# Patient Record
Sex: FEMALE | Race: WHITE | NOT HISPANIC OR LATINO | Employment: FULL TIME | URBAN - METROPOLITAN AREA
[De-identification: names, ages, dates, MRNs, and addresses within clinical notes are randomized per-mention and may not be internally consistent; named-entity substitution may affect disease eponyms.]

---

## 2019-05-02 ENCOUNTER — APPOINTMENT (OUTPATIENT)
Dept: PREADMISSION TESTING | Facility: HOSPITAL | Age: 69
End: 2019-05-02

## 2019-05-02 ENCOUNTER — HOSPITAL ENCOUNTER (OUTPATIENT)
Dept: GENERAL RADIOLOGY | Facility: HOSPITAL | Age: 69
Discharge: HOME | End: 2019-05-02
Admitting: ORTHOPAEDIC SURGERY

## 2019-05-02 VITALS — BODY MASS INDEX: 39.82 KG/M2 | WEIGHT: 202.82 LBS | HEIGHT: 60 IN

## 2019-05-02 LAB
ABO GROUP BLD: NORMAL
ALBUMIN SERPL-MCNC: 4 G/DL (ref 3.5–5.2)
ALBUMIN/GLOB SERPL: 0.8 G/DL
ALP SERPL-CCNC: 103 U/L (ref 39–117)
ALT SERPL W P-5'-P-CCNC: 18 U/L (ref 1–33)
ANION GAP SERPL CALCULATED.3IONS-SCNC: 12 MMOL/L
AST SERPL-CCNC: 21 U/L (ref 1–32)
BACTERIA UR QL AUTO: ABNORMAL /HPF
BASOPHILS # BLD AUTO: 0.07 10*3/MM3 (ref 0–0.2)
BASOPHILS NFR BLD AUTO: 0.9 % (ref 0–1.5)
BILIRUB SERPL-MCNC: 0.4 MG/DL (ref 0.2–1.2)
BILIRUB UR QL STRIP: NEGATIVE
BLD GP AB SCN SERPL QL: NEGATIVE
BUN BLD-MCNC: 18 MG/DL (ref 8–23)
BUN/CREAT SERPL: 21.7 (ref 7–25)
CALCIUM SPEC-SCNC: 9.1 MG/DL (ref 8.6–10.5)
CHLORIDE SERPL-SCNC: 101 MMOL/L (ref 98–107)
CLARITY UR: CLEAR
CO2 SERPL-SCNC: 28 MMOL/L (ref 22–29)
COLOR UR: YELLOW
CREAT BLD-MCNC: 0.83 MG/DL (ref 0.57–1)
CRP SERPL-MCNC: 1.15 MG/DL (ref 0–0.5)
DEPRECATED RDW RBC AUTO: 45.2 FL (ref 37–54)
EOSINOPHIL # BLD AUTO: 0.14 10*3/MM3 (ref 0–0.4)
EOSINOPHIL NFR BLD AUTO: 1.7 % (ref 0.3–6.2)
ERYTHROCYTE [DISTWIDTH] IN BLOOD BY AUTOMATED COUNT: 13.2 % (ref 12.3–15.4)
ERYTHROCYTE [SEDIMENTATION RATE] IN BLOOD: 24 MM/HR (ref 0–30)
GFR SERPL CREATININE-BSD FRML MDRD: 68 ML/MIN/1.73
GLOBULIN UR ELPH-MCNC: 4.8 GM/DL
GLUCOSE BLD-MCNC: 110 MG/DL (ref 65–99)
GLUCOSE UR STRIP-MCNC: NEGATIVE MG/DL
HBA1C MFR BLD: 5.9 % (ref 4.8–5.6)
HCT VFR BLD AUTO: 43.4 % (ref 34–46.6)
HGB BLD-MCNC: 13.9 G/DL (ref 12–15.9)
HGB UR QL STRIP.AUTO: NEGATIVE
HYALINE CASTS UR QL AUTO: ABNORMAL /LPF
IMM GRANULOCYTES # BLD AUTO: 0.02 10*3/MM3 (ref 0–0.05)
IMM GRANULOCYTES NFR BLD AUTO: 0.2 % (ref 0–0.5)
KETONES UR QL STRIP: NEGATIVE
LEUKOCYTE ESTERASE UR QL STRIP.AUTO: NEGATIVE
LYMPHOCYTES # BLD AUTO: 2.19 10*3/MM3 (ref 0.7–3.1)
LYMPHOCYTES NFR BLD AUTO: 26.7 % (ref 19.6–45.3)
MCH RBC QN AUTO: 30.1 PG (ref 26.6–33)
MCHC RBC AUTO-ENTMCNC: 32 G/DL (ref 31.5–35.7)
MCV RBC AUTO: 93.9 FL (ref 79–97)
MONOCYTES # BLD AUTO: 0.35 10*3/MM3 (ref 0.1–0.9)
MONOCYTES NFR BLD AUTO: 4.3 % (ref 5–12)
NEUTROPHILS # BLD AUTO: 5.44 10*3/MM3 (ref 1.7–7)
NEUTROPHILS NFR BLD AUTO: 66.4 % (ref 42.7–76)
NITRITE UR QL STRIP: NEGATIVE
PH UR STRIP.AUTO: 5.5 [PH] (ref 5–8)
PLATELET # BLD AUTO: 384 10*3/MM3 (ref 140–450)
PMV BLD AUTO: 10 FL (ref 6–12)
POTASSIUM BLD-SCNC: 3.8 MMOL/L (ref 3.5–5.2)
PROT SERPL-MCNC: 8.8 G/DL (ref 6–8.5)
PROT UR QL STRIP: NEGATIVE
RBC # BLD AUTO: 4.62 10*6/MM3 (ref 3.77–5.28)
RBC # UR: ABNORMAL /HPF
REF LAB TEST METHOD: ABNORMAL
RH BLD: POSITIVE
SODIUM BLD-SCNC: 141 MMOL/L (ref 136–145)
SP GR UR STRIP: 1.01 (ref 1–1.03)
SQUAMOUS #/AREA URNS HPF: ABNORMAL /HPF
UROBILINOGEN UR QL STRIP: NORMAL
WBC NRBC COR # BLD: 8.19 10*3/MM3 (ref 3.4–10.8)
WBC UR QL AUTO: ABNORMAL /HPF

## 2019-05-02 PROCEDURE — 86900 BLOOD TYPING SEROLOGIC ABO: CPT | Performed by: ORTHOPAEDIC SURGERY

## 2019-05-02 PROCEDURE — 81001 URINALYSIS AUTO W/SCOPE: CPT | Performed by: ORTHOPAEDIC SURGERY

## 2019-05-02 PROCEDURE — 36415 COLL VENOUS BLD VENIPUNCTURE: CPT

## 2019-05-02 PROCEDURE — 86901 BLOOD TYPING SEROLOGIC RH(D): CPT | Performed by: ORTHOPAEDIC SURGERY

## 2019-05-02 PROCEDURE — 83036 HEMOGLOBIN GLYCOSYLATED A1C: CPT | Performed by: ORTHOPAEDIC SURGERY

## 2019-05-02 PROCEDURE — 80053 COMPREHEN METABOLIC PANEL: CPT | Performed by: ORTHOPAEDIC SURGERY

## 2019-05-02 PROCEDURE — 86140 C-REACTIVE PROTEIN: CPT | Performed by: ORTHOPAEDIC SURGERY

## 2019-05-02 PROCEDURE — 86850 RBC ANTIBODY SCREEN: CPT | Performed by: ORTHOPAEDIC SURGERY

## 2019-05-02 PROCEDURE — 85025 COMPLETE CBC W/AUTO DIFF WBC: CPT | Performed by: ORTHOPAEDIC SURGERY

## 2019-05-02 PROCEDURE — 93010 ELECTROCARDIOGRAM REPORT: CPT | Performed by: INTERNAL MEDICINE

## 2019-05-02 PROCEDURE — 71046 X-RAY EXAM CHEST 2 VIEWS: CPT

## 2019-05-02 PROCEDURE — 85652 RBC SED RATE AUTOMATED: CPT | Performed by: ORTHOPAEDIC SURGERY

## 2019-05-02 PROCEDURE — 93005 ELECTROCARDIOGRAM TRACING: CPT

## 2019-05-02 RX ORDER — HYDROCHLOROTHIAZIDE 25 MG/1
25 TABLET ORAL DAILY
COMMUNITY

## 2019-05-02 RX ORDER — ACETAMINOPHEN 500 MG
500 TABLET ORAL EVERY 6 HOURS PRN
COMMUNITY

## 2019-05-02 RX ORDER — MELATONIN
1000 DAILY
COMMUNITY

## 2019-05-02 RX ORDER — LEVOTHYROXINE SODIUM 0.07 MG/1
75 TABLET ORAL DAILY
COMMUNITY

## 2019-05-02 RX ORDER — LISINOPRIL 40 MG/1
40 TABLET ORAL DAILY
COMMUNITY

## 2019-05-02 ASSESSMENT — HOOS JR
HOOS JR SCORE: 52.965
HOOS JR SCORE: 12

## 2019-05-02 NOTE — PAT
Patient attended joint replacement class today during PAT visit.    Patient to apply Chlorhexadine wipes  to surgical area (as instructed) the night before procedure and the AM of procedure. Wipes provided.    Per Anesthesia Request, patient instructed not to take their ACE/ARB medications on the AM of surgery.    Patient instructed to drink 20 ounces (or until full) of Gatorade or 20 ounces of G2 (if diabetic) and complete 1 hour before arrival time for procedure (NO RED Gatorade or G2)    Patient verbalized understanding.    Patient passed memory screen today.

## 2019-05-12 ENCOUNTER — ANESTHESIA EVENT (OUTPATIENT)
Dept: PERIOP | Facility: HOSPITAL | Age: 69
End: 2019-05-12

## 2019-05-12 RX ORDER — FAMOTIDINE 10 MG/ML
20 INJECTION, SOLUTION INTRAVENOUS ONCE
Status: CANCELLED | OUTPATIENT
Start: 2019-05-12 | End: 2019-05-12

## 2019-05-13 ENCOUNTER — APPOINTMENT (OUTPATIENT)
Dept: GENERAL RADIOLOGY | Facility: HOSPITAL | Age: 69
End: 2019-05-13

## 2019-05-13 ENCOUNTER — ANESTHESIA (OUTPATIENT)
Dept: PERIOP | Facility: HOSPITAL | Age: 69
End: 2019-05-13

## 2019-05-13 ENCOUNTER — HOSPITAL ENCOUNTER (INPATIENT)
Facility: HOSPITAL | Age: 69
LOS: 1 days | Discharge: HOME HEALTH | End: 2019-05-14
Attending: ORTHOPAEDIC SURGERY | Admitting: ORTHOPAEDIC SURGERY

## 2019-05-13 DIAGNOSIS — Z96.642 STATUS POST TOTAL REPLACEMENT OF LEFT HIP: ICD-10-CM

## 2019-05-13 DIAGNOSIS — Z74.09 IMPAIRED FUNCTIONAL MOBILITY, BALANCE, GAIT, AND ENDURANCE: Primary | ICD-10-CM

## 2019-05-13 DIAGNOSIS — M16.12 ARTHRITIS OF LEFT HIP: ICD-10-CM

## 2019-05-13 PROBLEM — E03.9 HYPOTHYROID: Status: ACTIVE | Noted: 2019-05-13

## 2019-05-13 PROBLEM — I10 HTN (HYPERTENSION): Status: ACTIVE | Noted: 2019-05-13

## 2019-05-13 LAB
ABO GROUP BLD: NORMAL
BLD GP AB SCN SERPL QL: NEGATIVE
GLUCOSE BLDC GLUCOMTR-MCNC: 166 MG/DL (ref 70–130)
POTASSIUM BLD-SCNC: 3.8 MMOL/L (ref 3.5–5.2)
RH BLD: POSITIVE
T&S EXPIRATION DATE: NORMAL

## 2019-05-13 PROCEDURE — 86900 BLOOD TYPING SEROLOGIC ABO: CPT | Performed by: ORTHOPAEDIC SURGERY

## 2019-05-13 PROCEDURE — 25010000002 FENTANYL CITRATE (PF) 100 MCG/2ML SOLUTION: Performed by: NURSE ANESTHETIST, CERTIFIED REGISTERED

## 2019-05-13 PROCEDURE — 25010000002 PHENYLEPHRINE PER 1 ML: Performed by: NURSE ANESTHETIST, CERTIFIED REGISTERED

## 2019-05-13 PROCEDURE — 25010000002 ONDANSETRON PER 1 MG: Performed by: ORTHOPAEDIC SURGERY

## 2019-05-13 PROCEDURE — C1776 JOINT DEVICE (IMPLANTABLE): HCPCS | Performed by: ORTHOPAEDIC SURGERY

## 2019-05-13 PROCEDURE — 86850 RBC ANTIBODY SCREEN: CPT | Performed by: ORTHOPAEDIC SURGERY

## 2019-05-13 PROCEDURE — 76000 FLUOROSCOPY <1 HR PHYS/QHP: CPT

## 2019-05-13 PROCEDURE — 25010000003 CEFAZOLIN IN DEXTROSE 2-4 GM/100ML-% SOLUTION: Performed by: ORTHOPAEDIC SURGERY

## 2019-05-13 PROCEDURE — 25010000002 DEXAMETHASONE PER 1 MG: Performed by: NURSE ANESTHETIST, CERTIFIED REGISTERED

## 2019-05-13 PROCEDURE — 82962 GLUCOSE BLOOD TEST: CPT

## 2019-05-13 PROCEDURE — 25010000002 ONDANSETRON PER 1 MG: Performed by: NURSE ANESTHETIST, CERTIFIED REGISTERED

## 2019-05-13 PROCEDURE — 84132 ASSAY OF SERUM POTASSIUM: CPT | Performed by: ANESTHESIOLOGY

## 2019-05-13 PROCEDURE — 25010000002 HYDROMORPHONE 1 MG/ML SOLUTION: Performed by: ORTHOPAEDIC SURGERY

## 2019-05-13 PROCEDURE — 86923 COMPATIBILITY TEST ELECTRIC: CPT

## 2019-05-13 PROCEDURE — 73502 X-RAY EXAM HIP UNI 2-3 VIEWS: CPT

## 2019-05-13 PROCEDURE — 25010000002 NEOSTIGMINE 10 MG/10ML SOLUTION: Performed by: NURSE ANESTHETIST, CERTIFIED REGISTERED

## 2019-05-13 PROCEDURE — 25010000002 PROPOFOL 1000 MG/ML EMULSION: Performed by: NURSE ANESTHETIST, CERTIFIED REGISTERED

## 2019-05-13 PROCEDURE — 86901 BLOOD TYPING SEROLOGIC RH(D): CPT | Performed by: ORTHOPAEDIC SURGERY

## 2019-05-13 PROCEDURE — 0SRB0JA REPLACEMENT OF LEFT HIP JOINT WITH SYNTHETIC SUBSTITUTE, UNCEMENTED, OPEN APPROACH: ICD-10-PCS | Performed by: ORTHOPAEDIC SURGERY

## 2019-05-13 DEVICE — OXINIUM FEMORAL HEAD 12/14 TAPER                                    32MM +0
Type: IMPLANTABLE DEVICE | Site: HIP | Status: FUNCTIONAL
Brand: OXINIUM

## 2019-05-13 DEVICE — POLARSTEM COLLAR STANDARD                                    NON-CEMENTED WITH TI/HA 2
Type: IMPLANTABLE DEVICE | Site: HIP | Status: FUNCTIONAL
Brand: POLARSTEM

## 2019-05-13 DEVICE — IMPLANTABLE DEVICE: Type: IMPLANTABLE DEVICE | Status: FUNCTIONAL

## 2019-05-13 DEVICE — R3 0 DEGREE XLPE ACETABULAR LINER                                    32MM ID X OD 50MM
Type: IMPLANTABLE DEVICE | Site: HIP | Status: FUNCTIONAL
Brand: R3

## 2019-05-13 DEVICE — R3 3 HOLE ACETABULAR SHELL 50MM
Type: IMPLANTABLE DEVICE | Site: HIP | Status: FUNCTIONAL
Brand: R3 ACETABULAR

## 2019-05-13 DEVICE — REFLECTION SPHERICAL HEAD SCREW 25MM
Type: IMPLANTABLE DEVICE | Site: HIP | Status: FUNCTIONAL
Brand: REFLECTION

## 2019-05-13 DEVICE — REFLECTION SPHERICAL HEAD SCREW 20MM
Type: IMPLANTABLE DEVICE | Site: HIP | Status: FUNCTIONAL
Brand: REFLECTION

## 2019-05-13 RX ORDER — FAMOTIDINE 20 MG/1
20 TABLET, FILM COATED ORAL ONCE
Status: COMPLETED | OUTPATIENT
Start: 2019-05-13 | End: 2019-05-13

## 2019-05-13 RX ORDER — LABETALOL HYDROCHLORIDE 5 MG/ML
10 INJECTION, SOLUTION INTRAVENOUS EVERY 4 HOURS PRN
Status: DISCONTINUED | OUTPATIENT
Start: 2019-05-13 | End: 2019-05-14 | Stop reason: HOSPADM

## 2019-05-13 RX ORDER — LIDOCAINE HYDROCHLORIDE 10 MG/ML
0.5 INJECTION, SOLUTION EPIDURAL; INFILTRATION; INTRACAUDAL; PERINEURAL ONCE AS NEEDED
Status: COMPLETED | OUTPATIENT
Start: 2019-05-13 | End: 2019-05-13

## 2019-05-13 RX ORDER — LABETALOL HYDROCHLORIDE 5 MG/ML
5 INJECTION, SOLUTION INTRAVENOUS
Status: DISCONTINUED | OUTPATIENT
Start: 2019-05-13 | End: 2019-05-13 | Stop reason: HOSPADM

## 2019-05-13 RX ORDER — SODIUM CHLORIDE 0.9 % (FLUSH) 0.9 %
3 SYRINGE (ML) INJECTION EVERY 12 HOURS SCHEDULED
Status: DISCONTINUED | OUTPATIENT
Start: 2019-05-13 | End: 2019-05-13 | Stop reason: HOSPADM

## 2019-05-13 RX ORDER — LIDOCAINE HYDROCHLORIDE 10 MG/ML
INJECTION, SOLUTION EPIDURAL; INFILTRATION; INTRACAUDAL; PERINEURAL AS NEEDED
Status: DISCONTINUED | OUTPATIENT
Start: 2019-05-13 | End: 2019-05-13 | Stop reason: SURG

## 2019-05-13 RX ORDER — NEOSTIGMINE METHYLSULFATE 1 MG/ML
INJECTION, SOLUTION INTRAVENOUS AS NEEDED
Status: DISCONTINUED | OUTPATIENT
Start: 2019-05-13 | End: 2019-05-13 | Stop reason: SURG

## 2019-05-13 RX ORDER — ONDANSETRON 2 MG/ML
4 INJECTION INTRAMUSCULAR; INTRAVENOUS ONCE AS NEEDED
Status: DISCONTINUED | OUTPATIENT
Start: 2019-05-13 | End: 2019-05-13 | Stop reason: HOSPADM

## 2019-05-13 RX ORDER — ONDANSETRON 2 MG/ML
INJECTION INTRAMUSCULAR; INTRAVENOUS AS NEEDED
Status: DISCONTINUED | OUTPATIENT
Start: 2019-05-13 | End: 2019-05-13 | Stop reason: SURG

## 2019-05-13 RX ORDER — LISINOPRIL 40 MG/1
40 TABLET ORAL DAILY
Status: DISCONTINUED | OUTPATIENT
Start: 2019-05-13 | End: 2019-05-14 | Stop reason: HOSPADM

## 2019-05-13 RX ORDER — EPHEDRINE SULFATE 50 MG/ML
5 INJECTION, SOLUTION INTRAVENOUS ONCE AS NEEDED
Status: DISCONTINUED | OUTPATIENT
Start: 2019-05-13 | End: 2019-05-13 | Stop reason: HOSPADM

## 2019-05-13 RX ORDER — SODIUM CHLORIDE 9 MG/ML
150 INJECTION, SOLUTION INTRAVENOUS CONTINUOUS
Status: DISCONTINUED | OUTPATIENT
Start: 2019-05-13 | End: 2019-05-14 | Stop reason: HOSPADM

## 2019-05-13 RX ORDER — DEXAMETHASONE SODIUM PHOSPHATE 4 MG/ML
INJECTION, SOLUTION INTRA-ARTICULAR; INTRALESIONAL; INTRAMUSCULAR; INTRAVENOUS; SOFT TISSUE AS NEEDED
Status: DISCONTINUED | OUTPATIENT
Start: 2019-05-13 | End: 2019-05-13 | Stop reason: SURG

## 2019-05-13 RX ORDER — MAGNESIUM HYDROXIDE 1200 MG/15ML
LIQUID ORAL AS NEEDED
Status: DISCONTINUED | OUTPATIENT
Start: 2019-05-13 | End: 2019-05-13 | Stop reason: HOSPADM

## 2019-05-13 RX ORDER — MEPERIDINE HYDROCHLORIDE 25 MG/ML
12.5 INJECTION INTRAMUSCULAR; INTRAVENOUS; SUBCUTANEOUS
Status: DISCONTINUED | OUTPATIENT
Start: 2019-05-13 | End: 2019-05-13 | Stop reason: HOSPADM

## 2019-05-13 RX ORDER — NALOXONE HCL 0.4 MG/ML
0.4 VIAL (ML) INJECTION AS NEEDED
Status: DISCONTINUED | OUTPATIENT
Start: 2019-05-13 | End: 2019-05-13 | Stop reason: HOSPADM

## 2019-05-13 RX ORDER — LEVOTHYROXINE SODIUM 0.07 MG/1
75 TABLET ORAL
Status: DISCONTINUED | OUTPATIENT
Start: 2019-05-14 | End: 2019-05-14 | Stop reason: HOSPADM

## 2019-05-13 RX ORDER — PREGABALIN 75 MG/1
75 CAPSULE ORAL ONCE
Status: COMPLETED | OUTPATIENT
Start: 2019-05-13 | End: 2019-05-13

## 2019-05-13 RX ORDER — GLYCOPYRROLATE 0.2 MG/ML
INJECTION INTRAMUSCULAR; INTRAVENOUS AS NEEDED
Status: DISCONTINUED | OUTPATIENT
Start: 2019-05-13 | End: 2019-05-13 | Stop reason: SURG

## 2019-05-13 RX ORDER — DOCUSATE SODIUM 100 MG/1
100 CAPSULE, LIQUID FILLED ORAL 2 TIMES DAILY
Status: DISCONTINUED | OUTPATIENT
Start: 2019-05-13 | End: 2019-05-14 | Stop reason: HOSPADM

## 2019-05-13 RX ORDER — CEFAZOLIN SODIUM 2 G/100ML
2 INJECTION, SOLUTION INTRAVENOUS EVERY 8 HOURS
Status: COMPLETED | OUTPATIENT
Start: 2019-05-13 | End: 2019-05-14

## 2019-05-13 RX ORDER — ONDANSETRON 4 MG/1
4 TABLET, FILM COATED ORAL EVERY 6 HOURS PRN
Status: DISCONTINUED | OUTPATIENT
Start: 2019-05-13 | End: 2019-05-14 | Stop reason: HOSPADM

## 2019-05-13 RX ORDER — NALOXONE HCL 0.4 MG/ML
0.1 VIAL (ML) INJECTION
Status: DISCONTINUED | OUTPATIENT
Start: 2019-05-13 | End: 2019-05-14 | Stop reason: HOSPADM

## 2019-05-13 RX ORDER — ATRACURIUM BESYLATE 10 MG/ML
INJECTION, SOLUTION INTRAVENOUS AS NEEDED
Status: DISCONTINUED | OUTPATIENT
Start: 2019-05-13 | End: 2019-05-13 | Stop reason: SURG

## 2019-05-13 RX ORDER — BISACODYL 10 MG
10 SUPPOSITORY, RECTAL RECTAL DAILY PRN
Status: DISCONTINUED | OUTPATIENT
Start: 2019-05-13 | End: 2019-05-14 | Stop reason: HOSPADM

## 2019-05-13 RX ORDER — ASPIRIN 325 MG
325 TABLET, DELAYED RELEASE (ENTERIC COATED) ORAL DAILY
Status: DISCONTINUED | OUTPATIENT
Start: 2019-05-14 | End: 2019-05-14 | Stop reason: HOSPADM

## 2019-05-13 RX ORDER — ONDANSETRON 2 MG/ML
4 INJECTION INTRAMUSCULAR; INTRAVENOUS EVERY 6 HOURS PRN
Status: DISCONTINUED | OUTPATIENT
Start: 2019-05-13 | End: 2019-05-14 | Stop reason: HOSPADM

## 2019-05-13 RX ORDER — HYDROCODONE BITARTRATE AND ACETAMINOPHEN 5; 325 MG/1; MG/1
1 TABLET ORAL EVERY 4 HOURS PRN
Status: DISCONTINUED | OUTPATIENT
Start: 2019-05-13 | End: 2019-05-13

## 2019-05-13 RX ORDER — CEFAZOLIN SODIUM 2 G/100ML
2 INJECTION, SOLUTION INTRAVENOUS ONCE
Status: COMPLETED | OUTPATIENT
Start: 2019-05-13 | End: 2019-05-13

## 2019-05-13 RX ORDER — FENTANYL CITRATE 50 UG/ML
INJECTION, SOLUTION INTRAMUSCULAR; INTRAVENOUS AS NEEDED
Status: DISCONTINUED | OUTPATIENT
Start: 2019-05-13 | End: 2019-05-13 | Stop reason: SURG

## 2019-05-13 RX ORDER — OXYCODONE HYDROCHLORIDE AND ACETAMINOPHEN 5; 325 MG/1; MG/1
1 TABLET ORAL EVERY 4 HOURS PRN
Status: DISCONTINUED | OUTPATIENT
Start: 2019-05-13 | End: 2019-05-14 | Stop reason: HOSPADM

## 2019-05-13 RX ORDER — SODIUM CHLORIDE, SODIUM LACTATE, POTASSIUM CHLORIDE, CALCIUM CHLORIDE 600; 310; 30; 20 MG/100ML; MG/100ML; MG/100ML; MG/100ML
100 INJECTION, SOLUTION INTRAVENOUS CONTINUOUS
Status: DISCONTINUED | OUTPATIENT
Start: 2019-05-13 | End: 2019-05-14 | Stop reason: SDUPTHER

## 2019-05-13 RX ORDER — FENTANYL CITRATE 50 UG/ML
50 INJECTION, SOLUTION INTRAMUSCULAR; INTRAVENOUS
Status: DISCONTINUED | OUTPATIENT
Start: 2019-05-13 | End: 2019-05-13 | Stop reason: HOSPADM

## 2019-05-13 RX ORDER — SODIUM CHLORIDE, SODIUM LACTATE, POTASSIUM CHLORIDE, CALCIUM CHLORIDE 600; 310; 30; 20 MG/100ML; MG/100ML; MG/100ML; MG/100ML
9 INJECTION, SOLUTION INTRAVENOUS CONTINUOUS
Status: DISCONTINUED | OUTPATIENT
Start: 2019-05-13 | End: 2019-05-14 | Stop reason: SDUPTHER

## 2019-05-13 RX ORDER — HYDROMORPHONE HYDROCHLORIDE 1 MG/ML
0.5 INJECTION, SOLUTION INTRAMUSCULAR; INTRAVENOUS; SUBCUTANEOUS
Status: DISCONTINUED | OUTPATIENT
Start: 2019-05-13 | End: 2019-05-13 | Stop reason: HOSPADM

## 2019-05-13 RX ORDER — BISACODYL 5 MG/1
10 TABLET, DELAYED RELEASE ORAL DAILY PRN
Status: DISCONTINUED | OUTPATIENT
Start: 2019-05-13 | End: 2019-05-14 | Stop reason: HOSPADM

## 2019-05-13 RX ORDER — SODIUM CHLORIDE 0.9 % (FLUSH) 0.9 %
3-10 SYRINGE (ML) INJECTION AS NEEDED
Status: DISCONTINUED | OUTPATIENT
Start: 2019-05-13 | End: 2019-05-13 | Stop reason: HOSPADM

## 2019-05-13 RX ADMIN — ONDANSETRON 4 MG: 2 INJECTION INTRAMUSCULAR; INTRAVENOUS at 17:04

## 2019-05-13 RX ADMIN — CEFAZOLIN SODIUM 2 G: 2 INJECTION, SOLUTION INTRAVENOUS at 15:26

## 2019-05-13 RX ADMIN — LIDOCAINE HYDROCHLORIDE 4 ML: 10 INJECTION, SOLUTION EPIDURAL; INFILTRATION; INTRACAUDAL; PERINEURAL at 15:48

## 2019-05-13 RX ADMIN — PHENYLEPHRINE HYDROCHLORIDE 160 MCG: 10 INJECTION INTRAVENOUS at 16:07

## 2019-05-13 RX ADMIN — NEOSTIGMINE METHYLSULFATE 3 MG: 1 INJECTION, SOLUTION INTRAVENOUS at 17:16

## 2019-05-13 RX ADMIN — HYDROMORPHONE HYDROCHLORIDE 0.5 MG: 1 INJECTION, SOLUTION INTRAMUSCULAR; INTRAVENOUS; SUBCUTANEOUS at 18:33

## 2019-05-13 RX ADMIN — SODIUM CHLORIDE, POTASSIUM CHLORIDE, SODIUM LACTATE AND CALCIUM CHLORIDE: 600; 310; 30; 20 INJECTION, SOLUTION INTRAVENOUS at 16:45

## 2019-05-13 RX ADMIN — TRANEXAMIC ACID 1000 MG: 100 INJECTION, SOLUTION INTRAVENOUS at 17:03

## 2019-05-13 RX ADMIN — FENTANYL CITRATE 50 MCG: 50 INJECTION INTRAMUSCULAR; INTRAVENOUS at 17:45

## 2019-05-13 RX ADMIN — FENTANYL CITRATE 100 MCG: 50 INJECTION, SOLUTION INTRAMUSCULAR; INTRAVENOUS at 15:48

## 2019-05-13 RX ADMIN — SODIUM CHLORIDE 150 ML/HR: 9 INJECTION, SOLUTION INTRAVENOUS at 18:37

## 2019-05-13 RX ADMIN — OXYCODONE HYDROCHLORIDE AND ACETAMINOPHEN 1 TABLET: 5; 325 TABLET ORAL at 21:52

## 2019-05-13 RX ADMIN — SODIUM CHLORIDE, POTASSIUM CHLORIDE, SODIUM LACTATE AND CALCIUM CHLORIDE 9 ML/HR: 600; 310; 30; 20 INJECTION, SOLUTION INTRAVENOUS at 12:12

## 2019-05-13 RX ADMIN — PROPOFOL 150 MCG/KG/MIN: 10 INJECTION, EMULSION INTRAVENOUS at 15:48

## 2019-05-13 RX ADMIN — TRANEXAMIC ACID 1000 MG: 100 INJECTION, SOLUTION INTRAVENOUS at 15:50

## 2019-05-13 RX ADMIN — MUPIROCIN 1 APPLICATION: 20 OINTMENT TOPICAL at 12:14

## 2019-05-13 RX ADMIN — LIDOCAINE HYDROCHLORIDE 0.5 ML: 10 INJECTION, SOLUTION EPIDURAL; INFILTRATION; INTRACAUDAL; PERINEURAL at 12:12

## 2019-05-13 RX ADMIN — LISINOPRIL 40 MG: 40 TABLET ORAL at 21:07

## 2019-05-13 RX ADMIN — FENTANYL CITRATE 50 MCG: 50 INJECTION INTRAMUSCULAR; INTRAVENOUS at 17:36

## 2019-05-13 RX ADMIN — ONDANSETRON 4 MG: 2 INJECTION INTRAMUSCULAR; INTRAVENOUS at 18:31

## 2019-05-13 RX ADMIN — FAMOTIDINE 20 MG: 20 TABLET ORAL at 12:12

## 2019-05-13 RX ADMIN — DEXAMETHASONE SODIUM PHOSPHATE 8 MG: 4 INJECTION, SOLUTION INTRAMUSCULAR; INTRAVENOUS at 15:48

## 2019-05-13 RX ADMIN — PREGABALIN 75 MG: 75 CAPSULE ORAL at 12:14

## 2019-05-13 RX ADMIN — DOCUSATE SODIUM 100 MG: 100 CAPSULE, LIQUID FILLED ORAL at 21:07

## 2019-05-13 RX ADMIN — GLYCOPYRROLATE 0.4 MG: 0.2 INJECTION, SOLUTION INTRAMUSCULAR; INTRAVENOUS at 17:16

## 2019-05-13 RX ADMIN — ATRACURIUM BESYLATE 40 MG: 10 INJECTION, SOLUTION INTRAVENOUS at 15:48

## 2019-05-13 NOTE — ANESTHESIA PROCEDURE NOTES
Airway  Urgency: elective    Airway not difficult    General Information and Staff    Patient location during procedure: OR  CRNA: Edgar Edward CRNA    Indications and Patient Condition  Indications for airway management: airway protection    Preoxygenated: yes  MILS not maintained throughout  Mask difficulty assessment: 1 - vent by mask    Final Airway Details  Final airway type: endotracheal airway      Successful airway: ETT  Cuffed: yes   Successful intubation technique: direct laryngoscopy  Facilitating devices/methods: Bougie  Endotracheal tube insertion site: oral  Blade: Shoemaker  Blade size: 2  ETT size (mm): 7.0  Cormack-Lehane Classification: grade IIa - partial view of glottis  Placement verified by: chest auscultation and capnometry   Cuff volume (mL): 5  Measured from: lips  ETT to lips (cm): 21  Number of attempts at approach: 1    Additional Comments  Negative epigastric sounds, Breath sound equal bilaterally with symmetric chest rise and fall

## 2019-05-13 NOTE — ANESTHESIA POSTPROCEDURE EVALUATION
Patient: Mary Headley    Procedure Summary     Date:  05/13/19 Room / Location:   [facility] /  [facility]    Anesthesia Start:  1526 Anesthesia Stop:      Procedure:  TOTAL ANTERIOR LEFT  HIP ARTHROPLASTY (Left Hip) Diagnosis:      Surgeon:  Andriy Torres MD Provider:  Arslan Orourke MD    Anesthesia Type:  MAC, spinal ASA Status:  3          Anesthesia Type: MAC, spinal  Last vitals  /91  180/87 (05/13/19 1204)   Temp 98  98.4 °F (36.9 °C) (05/13/19 1204)   Pulse 84  79 (05/13/19 1204)   Resp 18  18 (05/13/19 1204)     SpO2 92  95 % (05/13/19 1204)     Post Anesthesia Care and Evaluation    Patient location during evaluation: PACU  Patient participation: complete - patient participated  Level of consciousness: awake and alert  Pain score: 3  Pain management: adequate  Airway patency: patent  Anesthetic complications: No anesthetic complications  PONV Status: none  Cardiovascular status: hemodynamically stable and acceptable  Respiratory status: nonlabored ventilation, acceptable and nasal cannula  Hydration status: acceptable

## 2019-05-13 NOTE — ANESTHESIA PREPROCEDURE EVALUATION
Anesthesia Evaluation     history of anesthetic complications: PONV               Airway   Mallampati: II  TM distance: >3 FB  Neck ROM: full  No difficulty expected  Dental - normal exam     Pulmonary - normal exam   (+) a smoker (quit 20 years ago) Former,   Cardiovascular - normal exam    (+) hypertension,       Neuro/Psych  GI/Hepatic/Renal/Endo    (+) morbid obesity,  hypothyroidism,     Musculoskeletal     Abdominal  - normal exam    Bowel sounds: normal.   Substance History      OB/GYN          Other   (+) arthritis                     Anesthesia Plan    ASA 3     MAC and spinal     intravenous induction   Anesthetic plan, all risks, benefits, and alternatives have been provided, discussed and informed consent has been obtained with: patient.    Plan discussed with CRNA.

## 2019-05-13 NOTE — H&P
Patient Name: Mary Headley  MRN: [id number]  : 1950  DOS: 2019    Attending: Andriy Torres MD    Primary Care Provider: Gloria Jarquin PA      Chief complaint:  Left hip pain    Subjective   Patient is a 69 y.o. female presented for scheduled surgery by Dr. Torres. She anticipates a left total hip replacement today. Her hip has been painful for years. She denies use of assistive device for ambulation or recent falls.    When seen preop she is doing well. She denies pain or other complaints. She denies nausea, shortness of breath or chest pain. No hx of DVT or PE.      Allergies:  Allergies   Allergen Reactions   • Hydrocodone Nausea Only   • Levaquin [Levofloxacin] Nausea Only   • Latex Rash       Meds:  Medications Prior to Admission   Medication Sig Dispense Refill Last Dose   • acetaminophen (TYLENOL) 500 MG tablet Take 500 mg by mouth Every 6 (Six) Hours As Needed for Mild Pain .   2019 at    • hydrochlorothiazide (HYDRODIURIL) 25 MG tablet Take 25 mg by mouth Daily.   2019 at 2000   • levothyroxine (SYNTHROID, LEVOTHROID) 75 MCG tablet Take 75 mcg by mouth Daily.   2019 at 0600   • lisinopril (PRINIVIL,ZESTRIL) 40 MG tablet Take 40 mg by mouth Daily.   2019 at 2000   • cholecalciferol (VITAMIN D3) 1000 units tablet Take 1,000 Units by mouth Daily.   2019       History:   Past Medical History:   Diagnosis Date   • Arthritis    • Disease of thyroid gland    • Diverticulitis    • Glaucoma    • Hypertension    • PONV (postoperative nausea and vomiting)    • Wears glasses      Past Surgical History:   Procedure Laterality Date   • COLONOSCOPY     • EYE SURGERY      daniela cataracts   • LASIK Right    • THYROIDECTOMY, PARTIAL     • TOTAL HIP ARTHROPLASTY Right    • TUBAL ABDOMINAL LIGATION       History reviewed. No pertinent family history.  Social History     Tobacco Use   • Smoking status: Former Smoker     Years: 25.00     Last attempt to quit:      Years since  quittin.3   • Smokeless tobacco: Never Used   Substance Use Topics   • Alcohol use: No     Frequency: Never   • Drug use: No   She is  with 2 children. He is retired from .    Review of Systems  All systems were reviewed and negative except for:  Gastrointestinal: positive for  diarrhea    Vital Signs  Visit Vitals  /87 (BP Location: Right arm, Patient Position: Lying)   Pulse 79   Temp 98.4 °F (36.9 °C) (Temporal)   Resp 18   SpO2 95%       Physical Exam:    General Appearance:    Alert, cooperative, in no acute distress   Head:    Normocephalic, without obvious abnormality, atraumatic   Eyes:            Lids and lashes normal, conjunctivae and sclerae normal, no   icterus, no pallor, corneas clear   Ears:    Ears appear intact with no abnormalities noted   Neck:   No adenopathy, supple, trachea midline, no thyromegaly   Lungs:     Clear to auscultation,respirations regular, even and                   unlabored    Heart:    Regular rhythm and normal rate, normal S1 and S2, no            murmur, no gallop   Abdomen:     Normal bowel sounds, no masses, no organomegaly, soft        non-tender, non-distended, no guarding, no rebound                 tenderness   Genitalia:    Deferred   Extremities:   Moves all extremities well, no edema, no cyanosis, no              redness   Pulses:   Pulses palpable and equal bilaterally   Skin:   No bleeding, bruising or rash   Neurologic:   Cranial nerves 2 - 12 grossly intact, sensation intact      I reviewed the patient's new clinical results.     Results for EMERALD DEL CID (MRN [id number]) as of 2019 14:21   Ref. Range 2019 10:47   Glucose Latest Ref Range: 65 - 99 mg/dL 110 (H)   Sodium Latest Ref Range: 136 - 145 mmol/L 141   Potassium Latest Ref Range: 3.5 - 5.2 mmol/L 3.8   CO2 Latest Ref Range: 22.0 - 29.0 mmol/L 28.0   Chloride Latest Ref Range: 98 - 107 mmol/L 101   Anion Gap Latest Units: mmol/L 12.0   Creatinine Latest Ref  Range: 0.57 - 1.00 mg/dL 0.83   BUN Latest Ref Range: 8 - 23 mg/dL 18   BUN/Creatinine Ratio Latest Ref Range: 7.0 - 25.0  21.7   Calcium Latest Ref Range: 8.6 - 10.5 mg/dL 9.1   eGFR Non  Am Latest Ref Range: >60 mL/min/1.73 68   Alkaline Phosphatase Latest Ref Range: 39 - 117 U/L 103   Total Protein Latest Ref Range: 6.0 - 8.5 g/dL 8.8 (H)   ALT (SGPT) Latest Ref Range: 1 - 33 U/L 18   AST (SGOT) Latest Ref Range: 1 - 32 U/L 21   Total Bilirubin Latest Ref Range: 0.2 - 1.2 mg/dL 0.4   Albumin Latest Ref Range: 3.50 - 5.20 g/dL 4.00   Globulin Latest Units: gm/dL 4.8   A/G Ratio Latest Units: g/dL 0.8   Hemoglobin A1C Latest Ref Range: 4.80 - 5.60 % 5.90 (H)   C-Reactive Protein Latest Ref Range: 0.00 - 0.50 mg/dL 1.15 (H)   WBC Latest Ref Range: 3.40 - 10.80 10*3/mm3 8.19   RBC Latest Ref Range: 3.77 - 5.28 10*6/mm3 4.62   Hemoglobin Latest Ref Range: 12.0 - 15.9 g/dL 13.9   Hematocrit Latest Ref Range: 34.0 - 46.6 % 43.4   RDW Latest Ref Range: 12.3 - 15.4 % 13.2   MCV Latest Ref Range: 79.0 - 97.0 fL 93.9   MCH Latest Ref Range: 26.6 - 33.0 pg 30.1   MCHC Latest Ref Range: 31.5 - 35.7 g/dL 32.0   MPV Latest Ref Range: 6.0 - 12.0 fL 10.0   Platelets Latest Ref Range: 140 - 450 10*3/mm3 384     Assessment and Plan:     Status post total replacement of left hip    Arthritis of left hip    HTN (hypertension)    Hypothyroid      Plan  1. PT/OT- early ambulation post op  2. Pain control-prns   3. IS-encourage  4. DVT proph- mechs/ASA  5. Bowel regimen  6. Resume home medications as appropriate  7. Monitor post-op labs  8. DC planning     HTN  - Continue home lisinopril  - Monitor BP   - Holding parameters for BP meds  - Labetalol PRN for SBP>170    Hypothyroid  - Continue home synthroid      Carolann Delgado, SARAH  05/13/19  2:12 PM

## 2019-05-13 NOTE — H&P
Pre-Op H&P  Mary Headley  [id number]  1950    Chief complaint: Left hip pain    HPI:    Patient is a 69 y.o.female who presents with left hip pain and found to have left hip primary osteoarthritis.  Here today for left anterior total hip arthroplasty.     Review of Systems:  General ROS: negative for chills, fever or skin lesions;  No changes since last office visit  Cardiovascular ROS: no chest pain or dyspnea on exertion  Respiratory ROS: no cough, shortness of breath, or wheezing    Allergies:   Allergies   Allergen Reactions   • Hydrocodone Nausea Only   • Levaquin [Levofloxacin] Nausea Only   • Latex Rash       Home Meds:    No current facility-administered medications on file prior to encounter.      No current outpatient medications on file prior to encounter.       PMH:   Past Medical History:   Diagnosis Date   • Arthritis    • Disease of thyroid gland    • Diverticulitis    • Glaucoma    • Hypertension    • PONV (postoperative nausea and vomiting)    • Wears glasses      PSH:    Past Surgical History:   Procedure Laterality Date   • COLONOSCOPY     • EYE SURGERY      daniela cataracts   • LASIK Right    • THYROIDECTOMY, PARTIAL     • TOTAL HIP ARTHROPLASTY Right    • TUBAL ABDOMINAL LIGATION       Social History:   Tobacco:   Social History     Tobacco Use   Smoking Status Former Smoker   • Years: 25.00   • Last attempt to quit:    • Years since quittin.3   Smokeless Tobacco Never Used      Alcohol:     Social History     Substance and Sexual Activity   Alcohol Use No   • Frequency: Never       Vitals:           /87 (BP Location: Right arm, Patient Position: Lying)   Pulse 79   Temp 98.4 °F (36.9 °C) (Temporal)   Resp 18   SpO2 95%     Physical Exam:  General Appearance:    Alert, cooperative, no distress, appears stated age   Head:    Normocephalic, without obvious abnormality, atraumatic   Lungs:     Clear to auscultation bilaterally, respirations unlabored    Heart:   Regular  rate and rhythm, S1 and S2 normal, no murmur, rub    or gallop    Abdomen:    Soft, non-tender.  +bowel sounds   Breast Exam:    deferred   Genitalia:    deferred   Extremities:   Extremities normal, atraumatic, no cyanosis or edema   Skin:   Skin color, texture, turgor normal, no rashes or lesions   Neurologic:   Grossly intact   Results Review  LABS:  Lab Results   Component Value Date    WBC 8.19 05/02/2019    HGB 13.9 05/02/2019    HCT 43.4 05/02/2019    MCV 93.9 05/02/2019     05/02/2019    NEUTROABS 5.44 05/02/2019    GLUCOSE 110 (H) 05/02/2019    BUN 18 05/02/2019    CREATININE 0.83 05/02/2019    EGFRIFNONA 68 05/02/2019     05/02/2019    K 3.8 05/02/2019     05/02/2019    CO2 28.0 05/02/2019    CALCIUM 9.1 05/02/2019    ALBUMIN 4.00 05/02/2019    AST 21 05/02/2019    ALT 18 05/02/2019    BILITOT 0.4 05/02/2019       RADIOLOGY:  Imaging Results (last 72 hours)     ** No results found for the last 72 hours. **          I reviewed the patient's new clinical results.    Cancer Staging (if applicable)  Cancer Patient: __ yes _x_no __unknown; If yes, clinical stage T:__ N:__M:__, stage group or __N/A    Impression: Left hip osteoarthritis    Plan: Left anterior total hip arthroplasty    Sarah Dowling, SARAH   5/13/2019   12:25 PM

## 2019-05-13 NOTE — OP NOTE
TOTAL HIP ARTHROPLASTY ANTERIOR  Progress Note    Mary Headley  5/13/2019    Pre-op Diagnosis:   Left hip osteoarthritis       Post-Op Diagnosis Codes:  Left hip osteoarthritis    Procedure/CPT® Codes:  46720    Procedure(s):  TOTAL ANTERIOR LEFT  HIP ARTHROPLASTY    Surgeon(s):  Andriy Torres MD    Anesthesia: Spinal    Staff:   Circulator: Amauri Alonso RN  Scrub Person: Jennifer Rain N; Floresita Springerphillipbonifacio  Vendor Representative: Yoon Fabian  Nursing Assistant: Meek Daniel  Assistant: Yang Cotter PA    Estimated Blood Loss: 550 mL    Urine Voided: * No values recorded between 5/13/2019  3:25 PM and 5/13/2019  5:17 PM *    Specimens:                None          Drains:      Findings: Expected high-grade femoral head arthropathy    Complications: None      Implants: [employer] R3 50 acetabular cup; screw x 3; N/N polyethylene liner             Polarstem press-fit femur size 2 KA with +0/ 32 neck/head adapter      Indications:  54-year-old woman with end-stage left hip osteoarthritis symptoms. X-rays show near complete mild narrowing left hip joint space with low-grade osteophytes.  She had similar pain relieved by right total hip replacement several years ago at this institution.  Risks benefits indications and rationale for total hip arthroplasty discussed at length with patient. Patient signs own consent after all questions are answered.      Procedure:  While in the OR spinal anesthetic achieved with satisfactory level. Turned to the supine position and prepped and draped in standard fashion for anterior approach to the left hip on the [place] table. Fluoroscopy used to assess limb lengths. These were restored with final component selection and position. Incision made over the tensor fascia kisha and routine dissection carried down to raise the fascia over the medial margin of the tensor muscle belly. Circumflex vessels were identified and cauterized. Bleeding reasonably controlled with  estimated total blood loss 550 mL. Femoral neck isolated. T-shaped capsulotomy created. Mild effusion relieved. Standard neck cut made and head removed without difficulty noting superior cartilage irregularities. Acetabulum was exposed circumferentially. Acetabulum reamed from size 45 to size 49 noting satisfactory exposure of the subchondral bone with the size 49. Size 50 final component was seated with satisfactory press-fit characteristics.  3 superior directed fixation screws were placed with maximal bony purchase due to soft bone characteristics. Polyethylene insert placed without difficulty. Horizontal tilt was thought to be approximately 40° from Hilgenreiner's line. Anteversion approximately 25°. Component was under the anterior inferior acetabular margin. Acetabulum was thoroughly irrigated before during and after component placement.      Attention turned to the proximal femur. Trochanteric hook placed and hip externally rotated eventually to 160° after complete posterior lateral releases. Standard piriformis landmarks were used. Broached from size 0/1 to size 2 with good position relative to standard landmarks. Calcar reamer was passed after trials showed satisfactory recovery of limb lengths. Final component seated completely relative to the reaming with excellent rotational stability. Limb lengths appeared to be restored best with +0 x 32 mm head and neck adapter. Final components assembled and reduced. Wound thoroughly irrigated and closed in layers without a drain. Bleeding appeared to be well controlled at closure. Joint space injected with ropivacaine through a spinal needle placed percutaneously during closure. Standard Prineo dressing applied. Final counts were correct. Patient stable to recovery having tolerated procedure well throughout.      Andriy Torres MD    Date: 5/13/2019  Time: 5:22 PM

## 2019-05-14 VITALS
DIASTOLIC BLOOD PRESSURE: 58 MMHG | RESPIRATION RATE: 16 BRPM | SYSTOLIC BLOOD PRESSURE: 122 MMHG | HEART RATE: 94 BPM | TEMPERATURE: 98.3 F | OXYGEN SATURATION: 93 %

## 2019-05-14 PROBLEM — D72.829 LEUKOCYTOSIS: Status: ACTIVE | Noted: 2019-05-14

## 2019-05-14 PROBLEM — E87.1 HYPONATREMIA: Status: ACTIVE | Noted: 2019-05-14

## 2019-05-14 PROBLEM — G89.18 ACUTE POSTOPERATIVE PAIN: Status: ACTIVE | Noted: 2019-05-14

## 2019-05-14 PROBLEM — D62 ACUTE BLOOD LOSS ANEMIA: Status: ACTIVE | Noted: 2019-05-14

## 2019-05-14 LAB
ANION GAP SERPL CALCULATED.3IONS-SCNC: 8 MMOL/L
BASOPHILS # BLD AUTO: 0.01 10*3/MM3 (ref 0–0.2)
BASOPHILS NFR BLD AUTO: 0.1 % (ref 0–1.5)
BUN BLD-MCNC: 16 MG/DL (ref 8–23)
BUN/CREAT SERPL: 19.8 (ref 7–25)
CALCIUM SPEC-SCNC: 8.2 MG/DL (ref 8.6–10.5)
CHLORIDE SERPL-SCNC: 102 MMOL/L (ref 98–107)
CO2 SERPL-SCNC: 25 MMOL/L (ref 22–29)
CREAT BLD-MCNC: 0.81 MG/DL (ref 0.57–1)
DEPRECATED RDW RBC AUTO: 45.7 FL (ref 37–54)
EOSINOPHIL # BLD AUTO: 0 10*3/MM3 (ref 0–0.4)
EOSINOPHIL NFR BLD AUTO: 0 % (ref 0.3–6.2)
ERYTHROCYTE [DISTWIDTH] IN BLOOD BY AUTOMATED COUNT: 13.2 % (ref 12.3–15.4)
GFR SERPL CREATININE-BSD FRML MDRD: 70 ML/MIN/1.73
GLUCOSE BLD-MCNC: 176 MG/DL (ref 65–99)
GLUCOSE BLDC GLUCOMTR-MCNC: 168 MG/DL (ref 70–130)
GLUCOSE BLDC GLUCOMTR-MCNC: 184 MG/DL (ref 70–130)
HCT VFR BLD AUTO: 36.2 % (ref 34–46.6)
HGB BLD-MCNC: 11.7 G/DL (ref 12–15.9)
IMM GRANULOCYTES # BLD AUTO: 0.05 10*3/MM3 (ref 0–0.05)
IMM GRANULOCYTES NFR BLD AUTO: 0.3 % (ref 0–0.5)
LYMPHOCYTES # BLD AUTO: 0.7 10*3/MM3 (ref 0.7–3.1)
LYMPHOCYTES NFR BLD AUTO: 4.9 % (ref 19.6–45.3)
MCH RBC QN AUTO: 30.4 PG (ref 26.6–33)
MCHC RBC AUTO-ENTMCNC: 32.3 G/DL (ref 31.5–35.7)
MCV RBC AUTO: 94 FL (ref 79–97)
MONOCYTES # BLD AUTO: 0.68 10*3/MM3 (ref 0.1–0.9)
MONOCYTES NFR BLD AUTO: 4.7 % (ref 5–12)
NEUTROPHILS # BLD AUTO: 13 10*3/MM3 (ref 1.7–7)
NEUTROPHILS NFR BLD AUTO: 90.3 % (ref 42.7–76)
PLATELET # BLD AUTO: 310 10*3/MM3 (ref 140–450)
PMV BLD AUTO: 10.1 FL (ref 6–12)
POTASSIUM BLD-SCNC: 4.2 MMOL/L (ref 3.5–5.2)
RBC # BLD AUTO: 3.85 10*6/MM3 (ref 3.77–5.28)
SODIUM BLD-SCNC: 135 MMOL/L (ref 136–145)
WBC NRBC COR # BLD: 14.39 10*3/MM3 (ref 3.4–10.8)

## 2019-05-14 PROCEDURE — 97162 PT EVAL MOD COMPLEX 30 MIN: CPT

## 2019-05-14 PROCEDURE — 97110 THERAPEUTIC EXERCISES: CPT

## 2019-05-14 PROCEDURE — 85025 COMPLETE CBC W/AUTO DIFF WBC: CPT | Performed by: ORTHOPAEDIC SURGERY

## 2019-05-14 PROCEDURE — 82962 GLUCOSE BLOOD TEST: CPT

## 2019-05-14 PROCEDURE — 25010000003 CEFAZOLIN IN DEXTROSE 2-4 GM/100ML-% SOLUTION: Performed by: ORTHOPAEDIC SURGERY

## 2019-05-14 PROCEDURE — 25010000002 ONDANSETRON PER 1 MG: Performed by: NURSE PRACTITIONER

## 2019-05-14 PROCEDURE — 80048 BASIC METABOLIC PNL TOTAL CA: CPT | Performed by: NURSE PRACTITIONER

## 2019-05-14 RX ORDER — OXYCODONE HYDROCHLORIDE AND ACETAMINOPHEN 5; 325 MG/1; MG/1
1 TABLET ORAL EVERY 4 HOURS PRN
Qty: 40 TABLET | Refills: 0 | Status: SHIPPED | OUTPATIENT
Start: 2019-05-14 | End: 2019-05-23

## 2019-05-14 RX ORDER — DOCUSATE SODIUM 100 MG/1
100 CAPSULE, LIQUID FILLED ORAL 2 TIMES DAILY
Qty: 60 CAPSULE | Refills: 0 | Status: SHIPPED | OUTPATIENT
Start: 2019-05-14

## 2019-05-14 RX ORDER — ONDANSETRON 4 MG/1
4 TABLET, FILM COATED ORAL EVERY 8 HOURS PRN
Qty: 30 TABLET | Refills: 0 | Status: SHIPPED | OUTPATIENT
Start: 2019-05-14

## 2019-05-14 RX ORDER — ASPIRIN 325 MG
325 TABLET, DELAYED RELEASE (ENTERIC COATED) ORAL DAILY
Qty: 30 TABLET | Refills: 0 | Status: SHIPPED | OUTPATIENT
Start: 2019-05-15

## 2019-05-14 RX ADMIN — CEFAZOLIN SODIUM 2 G: 2 INJECTION, SOLUTION INTRAVENOUS at 06:46

## 2019-05-14 RX ADMIN — OXYCODONE HYDROCHLORIDE AND ACETAMINOPHEN 1 TABLET: 5; 325 TABLET ORAL at 11:28

## 2019-05-14 RX ADMIN — ONDANSETRON 4 MG: 2 INJECTION INTRAMUSCULAR; INTRAVENOUS at 08:17

## 2019-05-14 RX ADMIN — OXYCODONE HYDROCHLORIDE AND ACETAMINOPHEN 1 TABLET: 5; 325 TABLET ORAL at 05:08

## 2019-05-14 RX ADMIN — LISINOPRIL 40 MG: 40 TABLET ORAL at 08:17

## 2019-05-14 RX ADMIN — CEFAZOLIN SODIUM 2 G: 2 INJECTION, SOLUTION INTRAVENOUS at 00:02

## 2019-05-14 RX ADMIN — DOCUSATE SODIUM 100 MG: 100 CAPSULE, LIQUID FILLED ORAL at 08:17

## 2019-05-14 RX ADMIN — ONDANSETRON 4 MG: 2 INJECTION INTRAMUSCULAR; INTRAVENOUS at 00:34

## 2019-05-14 RX ADMIN — LEVOTHYROXINE SODIUM 75 MCG: 75 TABLET ORAL at 05:08

## 2019-05-14 RX ADMIN — ASPIRIN 325 MG: 325 TABLET, COATED ORAL at 08:17

## 2019-05-14 NOTE — THERAPY DISCHARGE NOTE
Acute Care - Physical Therapy Initial Eval/Discharge  [facility]     Patient Name: Mary Headley  : 1950  MRN: [id number]  Today's Date: 2019   Onset of Illness/Injury or Date of Surgery: 19  Date of Referral to PT: 19  Referring Physician: MD Brian      Admit Date: 2019    Visit Dx:    ICD-10-CM ICD-9-CM   1. Impaired functional mobility, balance, gait, and endurance Z74.09 V49.89     Patient Active Problem List   Diagnosis   • Arthritis of left hip   • Status post total replacement of left hip   • HTN (hypertension)   • Hypothyroid     Past Medical History:   Diagnosis Date   • Arthritis    • Disease of thyroid gland    • Diverticulitis    • Glaucoma    • Hypertension    • PONV (postoperative nausea and vomiting)    • Wears glasses      Past Surgical History:   Procedure Laterality Date   • COLONOSCOPY     • EYE SURGERY      daniela cataracts   • LASIK Right    • THYROIDECTOMY, PARTIAL     • TOTAL HIP ARTHROPLASTY Right    • TUBAL ABDOMINAL LIGATION            PT ASSESSMENT (last 12 hours)      Physical Therapy Evaluation     Row Name 19 0821          PT Evaluation Time/Intention    Subjective Information  complains of;pain  -LM     Document Type  discharge evaluation/summary;therapy note (daily note)  -LM     Mode of Treatment  physical therapy;individual therapy  -LM     Patient Effort  excellent  -LM     Symptoms Noted During/After Treatment  none  -LM     Row Name 19 0821          General Information    Patient Profile Reviewed?  yes  -LM     Onset of Illness/Injury or Date of Surgery  19  -LM     Referring Physician  MD Brian  -LM     Patient Observations  alert;cooperative;agree to therapy  -LM     Patient/Family Observations  Daughter present.  -LM     General Observations of Patient  Pt received semi-de la rosa in bed, IV intact.  -LM     Prior Level of Function  min assist:;all household mobility;community mobility;gait;transfer;bed mobility;ADL's  -LM      Equipment Currently Used at Home  walker, rolling;cane, straight will be buying shower chair today  -LM     Pertinent History of Current Functional Problem  Pt is 69 year old female, presents for surgical management of L hip pain and dysfunction that failed to improve with conservative treatment.  -LM     Existing Precautions/Restrictions  fall  -LM     Risks Reviewed  patient and family:;LOB;nausea/vomiting;dizziness;increased discomfort;change in vital signs  -LM     Benefits Reviewed  patient and family:;improve function;increase independence;increase strength;increase balance;decrease pain  -LM     Barriers to Rehab  none identified  -LM     Row Name 05/14/19 0821          Relationship/Environment    Lives With  spouse  -LM     Concerns About Impact on Relationships  Pt reports will be staying with daughter for a few days after d/c.  -LM     Row Name 05/14/19 0821          Resource/Environmental Concerns    Current Living Arrangements  home/apartment/condo  -LM     Row Name 05/14/19 0821          Cognitive Assessment/Interventions    Additional Documentation  Cognitive Assessment/Intervention (Group)  -LM     Row Name 05/14/19 0821          Cognitive Assessment/Intervention- PT/OT    Affect/Mental Status (Cognitive)  WNL  -LM     Orientation Status (Cognition)  oriented x 4  -LM     Follows Commands (Cognition)  WNL  -LM     Row Name 05/14/19 0821          Safety Issues, Functional Mobility    Impairments Affecting Function (Mobility)  pain  -LM     Row Name 05/14/19 0821          Mobility Assessment/Treatment    Extremity Weight-bearing Status  left lower extremity  -LM     Left Lower Extremity (Weight-bearing Status)  weight-bearing as tolerated (WBAT)  -LM     Row Name 05/14/19 0821          Bed Mobility Assessment/Treatment    Bed Mobility Assessment/Treatment  supine-sit  -LM     Supine-Sit [place] (Bed Mobility)  minimum assist (75% patient effort);verbal cues  -LM     Bed Mobility, Safety Issues   decreased use of legs for bridging/pushing  -LM     Assistive Device (Bed Mobility)  bed rails  -LM     Comment (Bed Mobility)  Attempted supine-sit from HOB flat to mimic home environment. Pt required minimal assistance to move left LE toward EOB. Cues for sequencing to roll onto right side then push into sitting. Pt denied dizziness once sitting EOB.  -LM     Row Name 05/14/19 0821          Transfer Assessment/Treatment    Transfer Assessment/Treatment  sit-stand transfer;stand-sit transfer;toilet transfer  -LM     Comment (Transfers)  Verbal cues to push from bed with UEs when standing, reach for chair armrests when lowering to sit. Assisted patient into bathroom, cues for correct hand placement on grab bar. Pt independent with toileting hygiene.  -LM     Sit-Stand [place] (Transfers)  contact guard;verbal cues  -LM     Stand-Sit [place] (Transfers)  contact guard;verbal cues  -LM     [place]da Level (Toilet Transfer)  contact guard;verbal cues  -LM     Assistive Device (Toilet Transfer)  raised toilet seat;walker, front-wheeled;grab bars/safety frame  -LM     Row Name 05/14/19 0821          Sit-Stand Transfer    Assistive Device (Sit-Stand Transfers)  walker, front-wheeled  -LM     Row Name 05/14/19 0821          Stand-Sit Transfer    Assistive Device (Stand-Sit Transfers)  walker, front-wheeled  -LM     Row Name 05/14/19 0821          Toilet Transfer    Type (Toilet Transfer)  sit-stand;stand-sit  -LM     Row Name 05/14/19 0821          Gait/Stairs Assessment/Training    82321 - Gait Training Minutes   4  -LM     [place]da Level (Gait)  stand by assist;verbal cues  -LM     Assistive Device (Gait)  walker, front-wheeled  -LM     Distance in Feet (Gait)  500  -LM     Pattern (Gait)  step-through  -LM     Deviations/Abnormal Patterns (Gait)  left sided deviations;antalgic;bilateral deviations;vitor decreased;stride length decreased  -LM     Bilateral Gait Deviations  forward flexed posture;heel  strike decreased  -LM     Left Sided Gait Deviations  weight shift ability decreased  -LM     Comment (Gait/Stairs)  Pt ambulated with step-through pattern at slow pace. Pt demonstrated upright posture, good step length. Cues to decrease bilateral UE weight bearing and increase left LE weight bearing.   -LM     Row Name 05/14/19 0821          General ROM    GENERAL ROM COMMENTS  L hip impaired 25%; otherwise, BLE WFL  -LM     Row Name 05/14/19 0821          MMT (Manual Muscle Testing)    General MMT Comments  LLE grossly 3+/5, RLE 4/5  -LM     Row Name 05/14/19 0821          Motor Assessment/Intervention    Additional Documentation  Balance (Group);Therapeutic Exercise (Group);Therapeutic Exercise Interventions (Group)  -LM     Row Name 05/14/19 0821          Therapeutic Exercise    39359 - PT Therapeutic Exercise Minutes  8  -LM     Row Name 05/14/19 0821          Therapeutic Exercise    Lower Extremity (Therapeutic Exercise)  gluteal sets;heel slides, left;LAQ (long arc quad), left;marching while seated;quad sets, left;SAQ (short arc quad), left;SLR (straight leg raise), left  -LM     Lower Extremity Range of Motion (Therapeutic Exercise)  hip abduction/adduction, left;ankle dorsiflexion/plantar flexion, bilateral  -LM     Exercise Type (Therapeutic Exercise)  AROM (active range of motion)  -LM     Position (Therapeutic Exercise)  seated  -LM     Sets/Reps (Therapeutic Exercise)  x15 reps each  -LM     Comment (Therapeutic Exercise)  Cues for technique; Ronald SLR  -LM     Row Name 05/14/19 0821          Balance    Balance  static sitting balance;static standing balance  -     Row Name 05/14/19 0821          Static Sitting Balance    Level of [place] (Unsupported Sitting, Static Balance)  supervision  -LM     Sitting Position (Unsupported Sitting, Static Balance)  sitting in chair  -LM     Time Able to Maintain Position (Unsupported Sitting, Static Balance)  2 to 3 minutes  -     Row Name 05/14/19 0821           Static Standing Balance    Level of [place] (Supported Standing, Static Balance)  standby assist  -LM     Time Able to Maintain Position (Supported Standing, Static Balance)  1 to 2 minutes  -LM     Assistive Device Utilized (Supported Standing, Static Balance)  walker, rolling  -LM     Row Name 05/14/19 0821          Sensory Assessment/Intervention    Sensory General Assessment  no sensation deficits identified  -LM     Row Name 05/14/19 0821          Pain Assessment    Additional Documentation  Pain Scale: Numbers Pre/Post-Treatment (Group)  -LM     Row Name 05/14/19 0821          Pain Scale: Numbers Pre/Post-Treatment    Pain Scale: Numbers, Pretreatment  2/10  -LM     Pain Scale: Numbers, Post-Treatment  2/10  -LM     Pain Location - Side  Left  -LM     Pain Location - Orientation  lower  -LM     Pain Location  extremity  -LM     Pain Intervention(s)  Repositioned;Ambulation/increased activity  -LM     Row Name             Wound 05/13/19 1611 Left hip incision    Wound - Properties Group Date first assessed: 05/13/19  -LB Time first assessed: 1611  -LB Side: Left  -LB Location: hip  -LB Type: incision  -LB    Row Name 05/14/19 0821          Plan of Care Review    Plan of Care Reviewed With  patient;daughter  -LM     Row Name 05/14/19 0821          Physical Therapy Clinical Impression    Date of Referral to PT  05/13/19  -LM     PT Diagnosis (PT Clinical Impression)  s/p elective left total anterior hip arthroplasty  -LM     Prognosis (PT Clinical Impression)  good  -LM     Criteria for Skilled Interventions Met (PT Clinical Impression)  no;other (see comments) pt anticipating d/c today  -LM     Rehab Potential (PT Clinical Summary)  good, to achieve stated therapy goals  -LM     Care Plan Review (PT)  evaluation/treatment results reviewed;care plan/treatment goals reviewed;risks/benefits reviewed;current/potential barriers reviewed;patient/other agree to care plan  -LM     Care Plan Review, Other  Participant (PT Clinical Impression)  daughter  -LM     Referral Needed to Another Service (PT)  home health care  -LM     Row Name 05/14/19 0821          Vital Signs    O2 Delivery Pre Treatment  room air  -LM     O2 Delivery Intra Treatment  room air  -LM     O2 Delivery Post Treatment  room air  -LM     Pre Patient Position  Supine  -LM     Intra Patient Position  Standing  -LM     Post Patient Position  Sitting  -LM     Row Name 05/14/19 0821          Positioning and Restraints    Pre-Treatment Position  in bed  -LM     Post Treatment Position  chair  -LM     In Chair  notified nsg;reclined;sitting;call light within reach;encouraged to call for assist;exit alarm on;with family/caregiver;legs elevated  -LM     Row Name 05/14/19 0821          Physical Therapy Discharge Summary    Additional Documentation  Discharge Summary, PT Eval (Group)  -LM     Row Name 05/14/19 0821          Discharge Summary, PT Eval    Reason for Discharge (PT Discharge Summary)  patient discharged from this facility  -LM     Outcomes Achieved Upon Discharge (PT Discharge Summary)  discharge from facility occurred on same date as evaluation  -LM     Transfer to Another Level of Care or Facility (PT Discharge Summary)  patient to receive therapy via home health  -LM     Row Name 05/14/19 0821          Living Environment    Home Accessibility  wheelchair accessible ramp to enter  -LM       User Key  (r) = Recorded By, (t) = Taken By, (c) = Cosigned By    Initials Name Provider Type    Amauri Oglesby, RN Registered Nurse    Zamzam Kinsey, PT Physical Therapist          Physical Therapy Education     Title: PT OT SLP Therapies (Done)     Topic: Physical Therapy (Done)     Point: Mobility training (Done)     Learning Progress Summary           Patient Acceptance, E,D,H, NIKOLAI,DU by DENYS at 5/14/2019  8:21 AM    Comment:  Reviewed anterior hip precautions, benefits of activity, safety with mobility and transfers, correct gait mechanics, HEP,  car transfer.   Family Acceptance, E,D,H, VU,DU by  at 5/14/2019  8:21 AM    Comment:  Reviewed anterior hip precautions, benefits of activity, safety with mobility and transfers, correct gait mechanics, HEP, car transfer.                   Point: Home exercise program (Done)     Learning Progress Summary           Patient Acceptance, E,D,H, VU,DU by LM at 5/14/2019  8:21 AM    Comment:  Reviewed anterior hip precautions, benefits of activity, safety with mobility and transfers, correct gait mechanics, HEP, car transfer.   Family Acceptance, E,D,H, VU,DU by  at 5/14/2019  8:21 AM    Comment:  Reviewed anterior hip precautions, benefits of activity, safety with mobility and transfers, correct gait mechanics, HEP, car transfer.                   Point: Body mechanics (Done)     Learning Progress Summary           Patient Acceptance, E,D,H, VU,DU by  at 5/14/2019  8:21 AM    Comment:  Reviewed anterior hip precautions, benefits of activity, safety with mobility and transfers, correct gait mechanics, HEP, car transfer.   Family Acceptance, E,D,H, VU,DU by  at 5/14/2019  8:21 AM    Comment:  Reviewed anterior hip precautions, benefits of activity, safety with mobility and transfers, correct gait mechanics, HEP, car transfer.                   Point: Precautions (Done)     Learning Progress Summary           Patient Acceptance, E,D,H, VU,DU by  at 5/14/2019  8:21 AM    Comment:  Reviewed anterior hip precautions, benefits of activity, safety with mobility and transfers, correct gait mechanics, HEP, car transfer.   Family Acceptance, E,D,H, VU,DU by  at 5/14/2019  8:21 AM    Comment:  Reviewed anterior hip precautions, benefits of activity, safety with mobility and transfers, correct gait mechanics, HEP, car transfer.                               User Key     Initials Effective Dates Name Provider Type Discipline     04/03/18 -  Zamzam Connell, PT Physical Therapist PT                PT Recommendation and  Plan  Anticipated Discharge Disposition (PT): home with assist, home with home health  Therapy Frequency (PT Clinical Impression): 2 times/day  Outcome Summary/Treatment Plan (PT)  Anticipated Equipment Needs at Discharge (PT): other (see comments)(none)  Anticipated Discharge Disposition (PT): home with assist, home with home health  Referral Needed to Another Service (PT): home health care  Reason for Discharge (PT Discharge Summary): patient discharged from this facility  Plan of Care Reviewed With: patient, daughter  Outcome Summary: PT eval complete. Pt ambulated 500 feet with SBA and RW, demonstrated good balance and safety throughout. Reviewed anterior hip precautions and HEP. Pt required Ronald for bed mobility and CGA for transfers. Pt anticipating d/c home with assist (daughter) and HHPT today. PADD score: 10    Outcome Measures     Row Name 05/14/19 0821             How much help from another person do you currently need...    Turning from your back to your side while in flat bed without using bedrails?  3  -LM      Moving from lying on back to sitting on the side of a flat bed without bedrails?  3  -LM      Moving to and from a bed to a chair (including a wheelchair)?  3  -LM      Standing up from a chair using your arms (e.g., wheelchair, bedside chair)?  3  -LM      Climbing 3-5 steps with a railing?  3  -LM      To walk in hospital room?  4  -LM      AM-PAC 6 Clicks Score  19  -LM         Functional Assessment    Outcome Measure Options  AM-PAC 6 Clicks Basic Mobility (PT)  -LM        User Key  (r) = Recorded By, (t) = Taken By, (c) = Cosigned By    Initials Name Provider Type    Zamzam Kinsey PT Physical Therapist           Time Calculation:   PT Charges     Row Name 05/14/19 0821             Time Calculation    Start Time  0821  -LM      PT Received On  05/14/19  -LM      PT Goal Re-Cert Due Date  05/24/19  -LM         Time Calculation- PT    Total Timed Code Minutes- PT  12 minute(s)  -LM          Timed Charges    08383 - PT Therapeutic Exercise Minutes  8  -LM      85604 - Gait Training Minutes   4  -LM        User Key  (r) = Recorded By, (t) = Taken By, (c) = Cosigned By    Initials Name Provider Type    Zamzam Kinsey, PT Physical Therapist        Therapy Charges for Today     Code Description Service Date Service Provider Modifiers Qty    80861299749 HC PT THER PROC EA 15 MIN 5/14/2019 Zamzam Connell, PT GP 1    59419166054 HC PT EVAL MOD COMPLEXITY 3 5/14/2019 Zamzam Connell, PT GP 1          PT G-Codes  Outcome Measure Options: AM-PAC 6 Clicks Basic Mobility (PT)  AM-PAC 6 Clicks Score: 19    PT Discharge Summary  Anticipated Discharge Disposition (PT): home with assist, home with home health  Reason for Discharge: Discharge from facility  Outcomes Achieved: Discharge from facility occurred on same date as evluation  Discharge Destination: Home with assist, Home with home health    Zamzam Connell PT  5/14/2019

## 2019-05-14 NOTE — PROGRESS NOTES
Discharge Planning Assessment  [facility]     Patient Name: Mary Headley  MRN: [id number]  Today's Date: 5/14/2019    Admit Date: 5/13/2019    Discharge Needs Assessment     Row Name 05/14/19 1158       Living Environment    Lives With  spouse    Name(s) of Who Lives With Patient  :  Axel    Current Living Arrangements  home/apartment/condo    Family Caregiver if Needed  spouse;child(xander), adult    Quality of Family Relationships  helpful;involved;supportive    Able to Return to Prior Arrangements  yes    Living Arrangement Comments  Ms. Headley lives with her  in a one story home in [facility].  She stated that her  and daughter are available to assist her at home as needed.       Resource/Environmental Concerns    Transportation Concerns  car, none       Transition Planning    Patient/Family Anticipates Transition to  home with family    Patient/Family Anticipated Services at Transition  home health care    Transportation Anticipated  family or friend will provide       Discharge Needs Assessment    Equipment Currently Used at Home  walker, rolling ADL kit    Equipment Needed After Discharge  commode    Outpatient/Agency/Support Group Needs  homecare agency    Discharge Facility/Level of Care Needs  home with home health    Offered/Gave Vendor List  yes    Patient's Choice of Community Agency(s)  [facility]        Discharge Plan     Row Name 05/14/19 1201       Plan    Plan  Home with family assistance and [facility]    Patient/Family in Agreement with Plan  yes    Plan Comments  Met with Ms. Headley and her daughter, Rosa M, at the bedside for discharge planning.  Ms. Headley is ambulating with a rolling walker.  She requested a bedside commode for home use and did not have preference for provider.  Contacted [facility] and they will be delivering the commode to the hospital room today.  Discussed physical therapy needs and Ms. Headley requested [facility].  Called and faxed referral to [place] at home health agency.  No other needs identified.  Ms. Headley will be transported home by her daughter at discharge.  CM will continue to follow.    Final Discharge Disposition Code  06 - home with home health care        Destination      No service coordination in this encounter.      Durable Medical Equipment - Selection Complete      Service Provider Request Status Selected Services Address Phone Number Fax Number    [facility] Selected Durable Medical Equipment [address] [facility] [address] [phone]      Dialysis/Infusion      No service coordination in this encounter.      Home Medical Care - Selection Complete      Service Provider Request Status Selected Services Address Phone Number Fax Number    [facility] Selected Home Health Services [address] [address] [phone]      Therapy      No service coordination in this encounter.      Community Resources      No service coordination in this encounter.        Expected Discharge Date and Time     Expected Discharge Date Expected Discharge Time    May 14, 2019         Demographic Summary     Row Name 05/14/19 1145       General Information    Admission Type  inpatient    Arrived From  home    Reason for Consult  discharge planning    General Information Comments  PCP:  Gloria Jarquin       Contact Information    Permission Granted to Share Info With      Contact Information Comments  Daughter: Rosa M Gaspar,  [phone]        Functional Status     Row Name 05/14/19 1156       Functional Status    Usual Activity Tolerance  moderate    Current Activity Tolerance  -- See PT notes.       Functional Status, IADL    Medications  independent    Meal Preparation  independent    Housekeeping  independent    Laundry  independent    Shopping  independent       Mental Status    General Appearance WDL  WDL        Psychosocial    No  documentation.       Abuse/Neglect    No documentation.       Legal     Row Name 05/14/19 5822       Financial/Legal    Who Manages Finances if Patient Unable  No advance directives.    Finance Comments  Ms. Headley does NOT have prescription drug coverage.  Verified insurance with patient.        Substance Abuse    No documentation.       Patient Forms    No documentation.           Kendra Nguyen

## 2019-05-14 NOTE — DISCHARGE SUMMARY
Patient Name: Mary Headley  MRN: [id number]  : 1950  DOS: 2019    Attending: No att. providers found    Primary Care Provider: Gloria Jarquin PA    Date of Admission:.2019 11:30 AM    Date of Discharge:  2019    Discharge Diagnosis:     Status post total replacement of left hip    Arthritis of left hip    HTN (hypertension)    Hypothyroid    Leukocytosis, likely reactive    Acute blood loss anemia, mild, asymptomatic    Acute postoperative pain    Hyponatremia, mild      Hospital Course  Patient is a 69 y.o. female presented for left hip arthroplasty by Dr. Torres. She tolerated surgery well and was admitted for further medical management. Her hip has been painful for years. She denies use of assistive device for ambulation or recent falls.    The patient has done well postop. The patient has been able to ambulate 500 feet with PT.    The patient has had good pain control with PO pain medications.    Adjustments were made to pain medications to optimize postop pain management. Risks and benefits of opiate medications discussed with patient.    The patient was placed on DVT prophylaxis including aspirin. The patient was encouraged to use IS for atelectasis prophylaxis.     The patient was placed on a bowel regimen to prevent constipation while on pain medication.   The patient's H/H was monitored with a slight decrease that remained asymptomatic.    It is felt by all involved that the patient can discharge home at this time, and the patient has no further questions        Procedures Performed  2019     Pre-op Diagnosis:   Left hip osteoarthritis       Post-Op Diagnosis Codes:  Left hip osteoarthritis     Procedure/CPT® Codes:  26225     Procedure(s):  TOTAL ANTERIOR LEFT  HIP ARTHROPLASTY     Surgeon(s):  Andriy Torres MD    Pertinent Test Results:    I reviewed the patient's new clinical results.   Results from last 7 days   Lab Units 19  0535   WBC 10*3/mm3 14.39*    HEMOGLOBIN g/dL 11.7*   HEMATOCRIT % 36.2   PLATELETS 10*3/mm3 310     Results for EMERALD DEL CID (MRN [id number]) as of 2019 10:14   Ref. Range 2019 10:47   Hemoglobin Latest Ref Range: 12.0 - 15.9 g/dL 13.9   Hematocrit Latest Ref Range: 34.0 - 46.6 % 43.4     Results from last 7 days   Lab Units 19  0535 19  1212   SODIUM mmol/L 135*  --    POTASSIUM mmol/L 4.2 3.8   CHLORIDE mmol/L 102  --    CO2 mmol/L 25.0  --    BUN mg/dL 16  --    CREATININE mg/dL 0.81  --    CALCIUM mg/dL 8.2*  --    GLUCOSE mg/dL 176*  --      I reviewed the patient's new imaging including images and reports.      Physical therapy: PT eval complete. Pt ambulated 500 feet with SBA and RW, demonstrated good balance and safety throughout. Reviewed anterior hip precautions and HEP. Pt required Ronald for bed mobility and CGA for transfers. Pt anticipating d/c home with assist (daughter) and HHPT today. PADD score: 10    Discharge Assessment:    Vital Signs  Visit Vitals  /58 (BP Location: Left arm, Patient Position: Lying)   Pulse 94   Temp 98.3 °F (36.8 °C) (Oral)   Resp 16   SpO2 93%     Temp (24hrs), Av.6 °F (36.4 °C), Min:96.3 °F (35.7 °C), Max:98.3 °F (36.8 °C)      General Appearance:    Alert, cooperative, in no acute distress   Lungs:     Clear to auscultation,respirations regular, even and                   unlabored    Heart:    Regular rhythm and normal rate, normal S1 and S2   Abdomen:     Normal bowel sounds, no masses, no organomegaly, soft        non-tender, non-distended, no guarding, no rebound                 tenderness   Extremities:   Moves all extremities well, no edema, no cyanosis, no              Redness. Left hip Prineo CDI   Pulses:   Pulses palpable and equal bilaterally   Skin:   No bleeding, bruising or rash   Neurologic:   Cranial nerves 2 - 12 grossly intact, sensation intact. Flexion and dorsiflexion intact bilateral feet.       Discharge Disposition: Home    Discharge  Medications     Discharge Medications      New Medications      Instructions Start Date   aspirin  MG tablet   325 mg, Oral, Daily   Start Date:  5/15/2019     ondansetron 4 MG tablet  Commonly known as:  ZOFRAN   4 mg, Oral, Every 8 Hours PRN      oxyCODONE-acetaminophen 5-325 MG per tablet  Commonly known as:  PERCOCET   1 tablet, Oral, Every 4 Hours PRN      STOOL SOFTENER 100 MG capsule  Generic drug:  docusate sodium   100 mg, Oral, 2 Times Daily         Continue These Medications      Instructions Start Date   acetaminophen 500 MG tablet  Commonly known as:  TYLENOL   500 mg, Oral, Every 6 Hours PRN      cholecalciferol 1000 units tablet  Commonly known as:  VITAMIN D3   1,000 Units, Oral, Daily      hydrochlorothiazide 25 MG tablet  Commonly known as:  HYDRODIURIL   25 mg, Oral, Daily      levothyroxine 75 MCG tablet  Commonly known as:  SYNTHROID, LEVOTHROID   75 mcg, Oral, Daily      lisinopril 40 MG tablet  Commonly known as:  PRINIVIL,ZESTRIL   40 mg, Oral, Daily             Discharge Diet: Regular diet    Activity at Discharge: WBAT LLE    Follow-up Appointments  Dr. Torres per his orders    Discharge took over 30 min.  Discussed with the patient and her daughter,  and all questioned fully answered.     05/14/19  2:32 PM

## 2019-05-14 NOTE — DISCHARGE PLACEMENT REQUEST
"Mary Del Cid (69 y.o. Female)   Home Health referral.  From Kendra Nguyen RN BSN, Case Management,  [phone]    Date of Birth Social Security Number Address Home Phone MRN    1950  [address] [place] [address] [phone] [id number]    [employer] Marital Status          Unknown        Admission Date Admission Type Admitting Provider Attending Provider Department, Room/Bed    19 Elective Andriy Torres MD Ryan, Andrew W, MD [facility] 3G, S364/1    Discharge Date Discharge Disposition Discharge Destination         Home or Self Care              Attending Provider:  Andriy Torres MD    Allergies:  Hydrocodone, Levaquin [Levofloxacin], Latex    Isolation:  None   Infection:  None   Code Status:  CPR    Ht:  152.4 cm (60\")   Wt:  92 kg (202 lb 13.2 oz)    Admission Cmt:  None   Principal Problem:  Status post total replacement of left hip [Z96.642]                 Active Insurance as of 2019     Primary Coverage     Payor Plan Insurance Group Employer/Plan Group    MEDICARE MEDICARE A & B      Payor Plan Address Payor Plan Phone Number Payor Plan Fax Number Effective Dates    [address] [phone]  3/1/2013 - None Entered    [place] [address]       Subscriber Name Subscriber Birth Date Member ID       MARY DEL CID 1950 [id number]                 Emergency Contacts      (Rel.) Home Phone Work Phone Mobile Phone    collins morales (Daughter) -- -- [phone]        [address] [address]  Phone:  [phone]  Fax:   Date: May 14, 2019      Ambulatory Referral to Home Health     Patient:  Mary Del Cid MRN:  [id number]    [address] [place] [id number] :  1950  SSN:    Phone: [phone] Sex:  F      INSURANCE PAYOR PLAN GROUP # SUBSCRIBER ID   Primary:    MEDICARE [id number]   [id number]      Referring Provider " Information:  SIOMARA FLOWER Phone: [phone] Fax:       Referral Information:   # Visits:  1 Referral Type: Home Health [42]   Urgency:  Routine Referral Reason: Specialty Services Required   Start Date: May 14, 2019 End Date:  To be determined by Insurer   Diagnosis: Impaired functional mobility, balance, gait, and endurance (Z74.09 [ICD-10-CM] V49.89 [ICD-9-CM])  Arthritis of left hip (M16.12 [ICD-10-CM] 716.95 [ICD-9-CM])  Status post total replacement of left hip (Z96.642 [ICD-10-CM] V43.64 [ICD-9-CM])      Refer to Dept:   Refer to Provider:   Refer to Facility:       Face to Face Visit Date: 2019  Follow-up Provider for Plan of Care? I treated the patient in an acute care facility and will not continue treatment after discharge.  Follow-up Provider: LORNE JARQUIN [[id number]]  Reason/Clinical Findings: w/p left hip surgery  Describe mobility limitations that make leaving home difficult: Impaired functional mobility, balance, gait and endurance.  Nursing/Therapeutic Services Requested: Physical Therapy  Frequency: Other     This document serves as a request of services and does not constitute Insurance authorization or approval of services.  To determine eligibility, please contact the members Insurance carrier to verify and review coverage.     If you have medical questions regarding this request for services. Please contact [address] at [phone] between the hours of 8:00am - 5:00pm (Mon-Fri).       Verbal Order Mode: Per protocol: cosign required  Authorizing Provider: Siomara Flower MD  Authorizing Provider's NPI: [id number]     Order Entered By: Kendra Nguyen 2019 11:43 AM     Electronically signed by:  Dr. Flower               History & Physical      Carolann Delgado APRN at 2019  2:09 PM          Patient Name: Mary Headley  MRN: [id number]  : 1950  DOS: 2019    Attending: Siomara Flower MD    Primary Care Provider: Lorne Jarquin PA      Chief  complaint:  Left hip pain    Subjective   Patient is a 69 y.o. female presented for scheduled surgery by Dr. Torres. She anticipates a left total hip replacement today. Her hip has been painful for years. She denies use of assistive device for ambulation or recent falls.    When seen preop she is doing well. She denies pain or other complaints. She denies nausea, shortness of breath or chest pain. No hx of DVT or PE.      Allergies:  Allergies   Allergen Reactions   • Hydrocodone Nausea Only   • Levaquin [Levofloxacin] Nausea Only   • Latex Rash       Meds:  Medications Prior to Admission   Medication Sig Dispense Refill Last Dose   • acetaminophen (TYLENOL) 500 MG tablet Take 500 mg by mouth Every 6 (Six) Hours As Needed for Mild Pain .   2019 at    • hydrochlorothiazide (HYDRODIURIL) 25 MG tablet Take 25 mg by mouth Daily.   2019 at    • levothyroxine (SYNTHROID, LEVOTHROID) 75 MCG tablet Take 75 mcg by mouth Daily.   2019 at 0600   • lisinopril (PRINIVIL,ZESTRIL) 40 MG tablet Take 40 mg by mouth Daily.   2019 at 2000   • cholecalciferol (VITAMIN D3) 1000 units tablet Take 1,000 Units by mouth Daily.   2019       History:   Past Medical History:   Diagnosis Date   • Arthritis    • Disease of thyroid gland    • Diverticulitis    • Glaucoma    • Hypertension    • PONV (postoperative nausea and vomiting)    • Wears glasses      Past Surgical History:   Procedure Laterality Date   • COLONOSCOPY     • EYE SURGERY      daniela cataracts   • LASIK Right    • THYROIDECTOMY, PARTIAL     • TOTAL HIP ARTHROPLASTY Right    • TUBAL ABDOMINAL LIGATION       History reviewed. No pertinent family history.  Social History     Tobacco Use   • Smoking status: Former Smoker     Years: 25.00     Last attempt to quit:      Years since quittin.3   • Smokeless tobacco: Never Used   Substance Use Topics   • Alcohol use: No     Frequency: Never   • Drug use: No   She is  with 2 children. He is  retired from .    Review of Systems  All systems were reviewed and negative except for:  Gastrointestinal: positive for  diarrhea    Vital Signs  Visit Vitals  /87 (BP Location: Right arm, Patient Position: Lying)   Pulse 79   Temp 98.4 °F (36.9 °C) (Temporal)   Resp 18   SpO2 95%       Physical Exam:    General Appearance:    Alert, cooperative, in no acute distress   Head:    Normocephalic, without obvious abnormality, atraumatic   Eyes:            Lids and lashes normal, conjunctivae and sclerae normal, no   icterus, no pallor, corneas clear   Ears:    Ears appear intact with no abnormalities noted   Neck:   No adenopathy, supple, trachea midline, no thyromegaly   Lungs:     Clear to auscultation,respirations regular, even and                   unlabored    Heart:    Regular rhythm and normal rate, normal S1 and S2, no            murmur, no gallop   Abdomen:     Normal bowel sounds, no masses, no organomegaly, soft        non-tender, non-distended, no guarding, no rebound                 tenderness   Genitalia:    Deferred   Extremities:   Moves all extremities well, no edema, no cyanosis, no              redness   Pulses:   Pulses palpable and equal bilaterally   Skin:   No bleeding, bruising or rash   Neurologic:   Cranial nerves 2 - 12 grossly intact, sensation intact      I reviewed the patient's new clinical results.     Results for EMERALD DEL CID (MRN [id number]) as of 5/13/2019 14:21   Ref. Range 5/2/2019 10:47   Glucose Latest Ref Range: 65 - 99 mg/dL 110 (H)   Sodium Latest Ref Range: 136 - 145 mmol/L 141   Potassium Latest Ref Range: 3.5 - 5.2 mmol/L 3.8   CO2 Latest Ref Range: 22.0 - 29.0 mmol/L 28.0   Chloride Latest Ref Range: 98 - 107 mmol/L 101   Anion Gap Latest Units: mmol/L 12.0   Creatinine Latest Ref Range: 0.57 - 1.00 mg/dL 0.83   BUN Latest Ref Range: 8 - 23 mg/dL 18   BUN/Creatinine Ratio Latest Ref Range: 7.0 - 25.0  21.7   Calcium Latest Ref Range: 8.6 - 10.5 mg/dL  9.1   eGFR Non  Am Latest Ref Range: >60 mL/min/1.73 68   Alkaline Phosphatase Latest Ref Range: 39 - 117 U/L 103   Total Protein Latest Ref Range: 6.0 - 8.5 g/dL 8.8 (H)   ALT (SGPT) Latest Ref Range: 1 - 33 U/L 18   AST (SGOT) Latest Ref Range: 1 - 32 U/L 21   Total Bilirubin Latest Ref Range: 0.2 - 1.2 mg/dL 0.4   Albumin Latest Ref Range: 3.50 - 5.20 g/dL 4.00   Globulin Latest Units: gm/dL 4.8   A/G Ratio Latest Units: g/dL 0.8   Hemoglobin A1C Latest Ref Range: 4.80 - 5.60 % 5.90 (H)   C-Reactive Protein Latest Ref Range: 0.00 - 0.50 mg/dL 1.15 (H)   WBC Latest Ref Range: 3.40 - 10.80 10*3/mm3 8.19   RBC Latest Ref Range: 3.77 - 5.28 10*6/mm3 4.62   Hemoglobin Latest Ref Range: 12.0 - 15.9 g/dL 13.9   Hematocrit Latest Ref Range: 34.0 - 46.6 % 43.4   RDW Latest Ref Range: 12.3 - 15.4 % 13.2   MCV Latest Ref Range: 79.0 - 97.0 fL 93.9   MCH Latest Ref Range: 26.6 - 33.0 pg 30.1   MCHC Latest Ref Range: 31.5 - 35.7 g/dL 32.0   MPV Latest Ref Range: 6.0 - 12.0 fL 10.0   Platelets Latest Ref Range: 140 - 450 10*3/mm3 384     Assessment and Plan:     Status post total replacement of left hip    Arthritis of left hip    HTN (hypertension)    Hypothyroid      Plan  1. PT/OT- early ambulation post op  2. Pain control-prns   3. IS-encourage  4. DVT proph- mechs/ASA  5. Bowel regimen  6. Resume home medications as appropriate  7. Monitor post-op labs  8. DC planning     HTN  - Continue home lisinopril  - Monitor BP   - Holding parameters for BP meds  - Labetalol PRN for SBP>170    Hypothyroid  - Continue home synthroid      SARAH Schulz  05/13/19  2:12 PM    Electronically signed by Carolann Delgado APRN at 5/13/2019  2:22 PM     Sarah Dowling APRN at 5/13/2019 12:24 PM          Pre-Op H&P  Mary Headley  [id number]  1950    Chief complaint: Left hip pain    HPI:    Patient is a 69 y.o.female who presents with left hip pain and found to have left hip primary osteoarthritis.  Here today  for left anterior total hip arthroplasty.     Review of Systems:  General ROS: negative for chills, fever or skin lesions;  No changes since last office visit  Cardiovascular ROS: no chest pain or dyspnea on exertion  Respiratory ROS: no cough, shortness of breath, or wheezing    Allergies:   Allergies   Allergen Reactions   • Hydrocodone Nausea Only   • Levaquin [Levofloxacin] Nausea Only   • Latex Rash       Home Meds:    No current facility-administered medications on file prior to encounter.      No current outpatient medications on file prior to encounter.       PMH:   Past Medical History:   Diagnosis Date   • Arthritis    • Disease of thyroid gland    • Diverticulitis    • Glaucoma    • Hypertension    • PONV (postoperative nausea and vomiting)    • Wears glasses      PSH:    Past Surgical History:   Procedure Laterality Date   • COLONOSCOPY     • EYE SURGERY      daniela cataracts   • LASIK Right    • THYROIDECTOMY, PARTIAL     • TOTAL HIP ARTHROPLASTY Right    • TUBAL ABDOMINAL LIGATION       Social History:   Tobacco:   Social History     Tobacco Use   Smoking Status Former Smoker   • Years: 25.00   • Last attempt to quit:    • Years since quittin.3   Smokeless Tobacco Never Used      Alcohol:     Social History     Substance and Sexual Activity   Alcohol Use No   • Frequency: Never       Vitals:           /87 (BP Location: Right arm, Patient Position: Lying)   Pulse 79   Temp 98.4 °F (36.9 °C) (Temporal)   Resp 18   SpO2 95%     Physical Exam:  General Appearance:    Alert, cooperative, no distress, appears stated age   Head:    Normocephalic, without obvious abnormality, atraumatic   Lungs:     Clear to auscultation bilaterally, respirations unlabored    Heart:   Regular rate and rhythm, S1 and S2 normal, no murmur, rub    or gallop    Abdomen:    Soft, non-tender.  +bowel sounds   Breast Exam:    deferred   Genitalia:    deferred   Extremities:   Extremities normal, atraumatic, no  cyanosis or edema   Skin:   Skin color, texture, turgor normal, no rashes or lesions   Neurologic:   Grossly intact   Results Review  LABS:  Lab Results   Component Value Date    WBC 8.19 05/02/2019    HGB 13.9 05/02/2019    HCT 43.4 05/02/2019    MCV 93.9 05/02/2019     05/02/2019    NEUTROABS 5.44 05/02/2019    GLUCOSE 110 (H) 05/02/2019    BUN 18 05/02/2019    CREATININE 0.83 05/02/2019    EGFRIFNONA 68 05/02/2019     05/02/2019    K 3.8 05/02/2019     05/02/2019    CO2 28.0 05/02/2019    CALCIUM 9.1 05/02/2019    ALBUMIN 4.00 05/02/2019    AST 21 05/02/2019    ALT 18 05/02/2019    BILITOT 0.4 05/02/2019       RADIOLOGY:  Imaging Results (last 72 hours)     ** No results found for the last 72 hours. **          I reviewed the patient's new clinical results.    Cancer Staging (if applicable)  Cancer Patient: __ yes _x_no __unknown; If yes, clinical stage T:__ N:__M:__, stage group or __N/A    Impression: Left hip osteoarthritis    Plan: Left anterior total hip arthroplasty    Sarah Dowling, SARAH   5/13/2019   12:25 PM    Electronically signed by Andriy Torres MD at 5/13/2019  2:55 PM          Operative/Procedure Notes (most recent note)      Andriy Torres MD at 5/13/2019  4:11 PM        TOTAL HIP ARTHROPLASTY ANTERIOR  Progress Note    Mary Headley  5/13/2019    Pre-op Diagnosis:   Left hip osteoarthritis       Post-Op Diagnosis Codes:  Left hip osteoarthritis    Procedure/CPT® Codes:  08363    Procedure(s):  TOTAL ANTERIOR LEFT  HIP ARTHROPLASTY    Surgeon(s):  Andriy Torres MD    Anesthesia: Spinal    Staff:   Circulator: Amauri Alonso RN  Scrub Person: Rain Rodriguez Rummana  Vendor Representative: Yoon Fabian  Nursing Assistant: Meek Daniel  Assistant: Yang Cotter PA    Estimated Blood Loss: 550 mL    Urine Voided: * No values recorded between 5/13/2019  3:25 PM and 5/13/2019  5:17 PM *    Specimens:                None          Drains:      Findings:  Expected high-grade femoral head arthropathy    Complications: None      Implants: [employer] R3 50 acetabular cup; screw x 3; N/N polyethylene liner             Polarstem press-fit femur size 2 KA with +0/ 32 neck/head adapter      Indications:  54-year-old woman with end-stage left hip osteoarthritis symptoms. X-rays show near complete mild narrowing left hip joint space with low-grade osteophytes.  She had similar pain relieved by right total hip replacement several years ago at this institution.  Risks benefits indications and rationale for total hip arthroplasty discussed at length with patient. Patient signs own consent after all questions are answered.      Procedure:  While in the OR spinal anesthetic achieved with satisfactory level. Turned to the supine position and prepped and draped in standard fashion for anterior approach to the left hip on the [place] table. Fluoroscopy used to assess limb lengths. These were restored with final component selection and position. Incision made over the tensor fascia kisha and routine dissection carried down to raise the fascia over the medial margin of the tensor muscle belly. Circumflex vessels were identified and cauterized. Bleeding reasonably controlled with estimated total blood loss 550 mL. Femoral neck isolated. T-shaped capsulotomy created. Mild effusion relieved. Standard neck cut made and head removed without difficulty noting superior cartilage irregularities. Acetabulum was exposed circumferentially. Acetabulum reamed from size 45 to size 49 noting satisfactory exposure of the subchondral bone with the size 49. Size 50 final component was seated with satisfactory press-fit characteristics.  3 superior directed fixation screws were placed with maximal bony purchase due to soft bone characteristics. Polyethylene insert placed without difficulty. Horizontal tilt was thought to be approximately 40° from Hilgenreiner's line. Anteversion approximately 25°.  Component was under the anterior inferior acetabular margin. Acetabulum was thoroughly irrigated before during and after component placement.      Attention turned to the proximal femur. Trochanteric hook placed and hip externally rotated eventually to 160° after complete posterior lateral releases. Standard piriformis landmarks were used. Broached from size 0/1 to size 2 with good position relative to standard landmarks. Calcar reamer was passed after trials showed satisfactory recovery of limb lengths. Final component seated completely relative to the reaming with excellent rotational stability. Limb lengths appeared to be restored best with +0 x 32 mm head and neck adapter. Final components assembled and reduced. Wound thoroughly irrigated and closed in layers without a drain. Bleeding appeared to be well controlled at closure. Joint space injected with ropivacaine through a spinal needle placed percutaneously during closure. Standard Prineo dressing applied. Final counts were correct. Patient stable to recovery having tolerated procedure well throughout.      Andriy Torres MD    Date: 5/13/2019  Time: 5:22 PM        Electronically signed by Andriy Torres MD at 5/13/2019  5:26 PM          Physician Progress Notes (most recent note)      Andriy Torres MD at 5/14/2019  8:09 AM          Mary Headley  [id number]  1950    /60 (BP Location: Right arm, Patient Position: Lying)   Pulse 88   Temp 97.8 °F (36.6 °C) (Oral)   Resp 16   SpO2 93%     Lab Results (last 24 hours)     Procedure Component Value Units Date/Time    CBC & Differential [[id number]] Collected:  05/14/19 0535    Specimen:  Blood Updated:  05/14/19 0706    Narrative:       The following orders were created for panel order CBC & Differential.  Procedure                               Abnormality         Status                     ---------                               -----------         ------                     CBC Auto  Differential[[id number]]        Abnormal            Final result                 Please view results for these tests on the individual orders.    CBC Auto Differential [[id number]]  (Abnormal) Collected:  05/14/19 0535    Specimen:  Blood Updated:  05/14/19 0706     WBC 14.39 10*3/mm3      RBC 3.85 10*6/mm3      Hemoglobin 11.7 g/dL      Hematocrit 36.2 %      MCV 94.0 fL      MCH 30.4 pg      MCHC 32.3 g/dL      RDW 13.2 %      RDW-SD 45.7 fl      MPV 10.1 fL      Platelets 310 10*3/mm3      Neutrophil % 90.3 %      Lymphocyte % 4.9 %      Monocyte % 4.7 %      Eosinophil % 0.0 %      Basophil % 0.1 %      Immature Grans % 0.3 %      Neutrophils, Absolute 13.00 10*3/mm3      Lymphocytes, Absolute 0.70 10*3/mm3      Monocytes, Absolute 0.68 10*3/mm3      Eosinophils, Absolute 0.00 10*3/mm3      Basophils, Absolute 0.01 10*3/mm3      Immature Grans, Absolute 0.05 10*3/mm3     Basic Metabolic Panel [[id number]]  (Abnormal) Collected:  05/14/19 0535    Specimen:  Blood Updated:  05/14/19 0700     Glucose 176 mg/dL      BUN 16 mg/dL      Creatinine 0.81 mg/dL      Sodium 135 mmol/L      Potassium 4.2 mmol/L      Chloride 102 mmol/L      CO2 25.0 mmol/L      Calcium 8.2 mg/dL      eGFR Non African Amer 70 mL/min/1.73      BUN/Creatinine Ratio 19.8     Anion Gap 8.0 mmol/L     Narrative:       GFR Normal >60  Chronic Kidney Disease <60  Kidney Failure <15    POC Glucose Once [[id number]]  (Abnormal) Collected:  05/13/19 2015    Specimen:  Blood Updated:  05/13/19 2016     Glucose 166 mg/dL     Potassium [[id number]]  (Normal) Collected:  05/13/19 1212    Specimen:  Blood Updated:  05/13/19 1249     Potassium 3.8 mmol/L           Patient Care Team:  Gloria Jarquin PA as PCP - General    SUBJECTIVE  Pain well controlled.  Good start in physical therapy.    PHYSICAL EXAM  Incision benign  Minimal thigh swelling  Neurovascularly intact to knees and toes       Status post total replacement of left hip    Arthritis of left hip     HTN (hypertension)    Hypothyroid      PLAN / DISPOSITION:  Hemoglobin 11.7 today - no transfusion anticipated  Discharge home today if cleared by PT    Andriy Torres MD  19  8:09 AM    Electronically signed by Andriy Torres MD at 2019  8:09 AM          Physical Therapy Notes (most recent note)      Zamzam Connell, PT at 2019  9:16 AM  Version 1 of 1         Acute Care - Physical Therapy Initial Eval/Discharge  [facility]     Patient Name: Mary Headley  : 1950  MRN: [id number]  Today's Date: 2019   Onset of Illness/Injury or Date of Surgery: 19  Date of Referral to PT: 19  Referring Physician: MD Brian      Admit Date: 2019    Visit Dx:    ICD-10-CM ICD-9-CM   1. Impaired functional mobility, balance, gait, and endurance Z74.09 V49.89     Patient Active Problem List   Diagnosis   • Arthritis of left hip   • Status post total replacement of left hip   • HTN (hypertension)   • Hypothyroid     Past Medical History:   Diagnosis Date   • Arthritis    • Disease of thyroid gland    • Diverticulitis    • Glaucoma    • Hypertension    • PONV (postoperative nausea and vomiting)    • Wears glasses      Past Surgical History:   Procedure Laterality Date   • COLONOSCOPY     • EYE SURGERY      daniela cataracts   • LASIK Right    • THYROIDECTOMY, PARTIAL     • TOTAL HIP ARTHROPLASTY Right    • TUBAL ABDOMINAL LIGATION            PT ASSESSMENT (last 12 hours)      Physical Therapy Evaluation     Row Name 19 0821          PT Evaluation Time/Intention    Subjective Information  complains of;pain  -LM     Document Type  discharge evaluation/summary;therapy note (daily note)  -LM     Mode of Treatment  physical therapy;individual therapy  -LM     Patient Effort  excellent  -LM     Symptoms Noted During/After Treatment  none  -LM     Row Name 19 0821          General Information    Patient Profile Reviewed?  yes  -LM     Onset of Illness/Injury or Date of Surgery   05/13/19  -LM     Referring Physician  MD Brian  -LM     Patient Observations  alert;cooperative;agree to therapy  -LM     Patient/Family Observations  Daughter present.  -LM     General Observations of Patient  Pt received semi-de la rosa in bed, IV intact.  -LM     Prior Level of Function  min assist:;all household mobility;community mobility;gait;transfer;bed mobility;ADL's  -LM     Equipment Currently Used at Home  walker, rolling;cane, straight will be buying shower chair today  -LM     Pertinent History of Current Functional Problem  Pt is 69 year old female, presents for surgical management of L hip pain and dysfunction that failed to improve with conservative treatment.  -LM     Existing Precautions/Restrictions  fall  -LM     Risks Reviewed  patient and family:;LOB;nausea/vomiting;dizziness;increased discomfort;change in vital signs  -LM     Benefits Reviewed  patient and family:;improve function;increase independence;increase strength;increase balance;decrease pain  -LM     Barriers to Rehab  none identified  -LM     Row Name 05/14/19 0821          Relationship/Environment    Lives With  spouse  -LM     Concerns About Impact on Relationships  Pt reports will be staying with daughter for a few days after d/c.  -LM     Row Name 05/14/19 0821          Resource/Environmental Concerns    Current Living Arrangements  home/apartment/condo  -LM     Row Name 05/14/19 0821          Cognitive Assessment/Interventions    Additional Documentation  Cognitive Assessment/Intervention (Group)  -LM     Row Name 05/14/19 0821          Cognitive Assessment/Intervention- PT/OT    Affect/Mental Status (Cognitive)  WNL  -LM     Orientation Status (Cognition)  oriented x 4  -LM     Follows Commands (Cognition)  WNL  -LM     Row Name 05/14/19 0821          Safety Issues, Functional Mobility    Impairments Affecting Function (Mobility)  pain  -LM     Row Name 05/14/19 0821          Mobility Assessment/Treatment    Extremity  Weight-bearing Status  left lower extremity  -LM     Left Lower Extremity (Weight-bearing Status)  weight-bearing as tolerated (WBAT)  -LM     Row Name 05/14/19 0821          Bed Mobility Assessment/Treatment    Bed Mobility Assessment/Treatment  supine-sit  -LM     Supine-Sit [place] (Bed Mobility)  minimum assist (75% patient effort);verbal cues  -LM     Bed Mobility, Safety Issues  decreased use of legs for bridging/pushing  -LM     Assistive Device (Bed Mobility)  bed rails  -LM     Comment (Bed Mobility)  Attempted supine-sit from HOB flat to mimic home environment. Pt required minimal assistance to move left LE toward EOB. Cues for sequencing to roll onto right side then push into sitting. Pt denied dizziness once sitting EOB.  -LM     Row Name 05/14/19 0821          Transfer Assessment/Treatment    Transfer Assessment/Treatment  sit-stand transfer;stand-sit transfer;toilet transfer  -LM     Comment (Transfers)  Verbal cues to push from bed with UEs when standing, reach for chair armrests when lowering to sit. Assisted patient into bathroom, cues for correct hand placement on grab bar. Pt independent with toileting hygiene.  -LM     Sit-Stand [place] (Transfers)  contact guard;verbal cues  -LM     Stand-Sit [place] (Transfers)  contact guard;verbal cues  -LM     Renner Level (Toilet Transfer)  contact guard;verbal cues  -LM     Assistive Device (Toilet Transfer)  raised toilet seat;walker, front-wheeled;grab bars/safety frame  -LM     Row Name 05/14/19 0821          Sit-Stand Transfer    Assistive Device (Sit-Stand Transfers)  walker, front-wheeled  -LM     Row Name 05/14/19 0821          Stand-Sit Transfer    Assistive Device (Stand-Sit Transfers)  walker, front-wheeled  -LM     Row Name 05/14/19 0821          Toilet Transfer    Type (Toilet Transfer)  sit-stand;stand-sit  -LM     Row Name 05/14/19 0821          Gait/Stairs Assessment/Training    03525 - Gait Training Minutes   4  -LM      [place] Level (Gait)  stand by assist;verbal cues  -LM     Assistive Device (Gait)  walker, front-wheeled  -LM     Distance in Feet (Gait)  500  -LM     Pattern (Gait)  step-through  -LM     Deviations/Abnormal Patterns (Gait)  left sided deviations;antalgic;bilateral deviations;vitor decreased;stride length decreased  -LM     Bilateral Gait Deviations  forward flexed posture;heel strike decreased  -LM     Left Sided Gait Deviations  weight shift ability decreased  -LM     Comment (Gait/Stairs)  Pt ambulated with step-through pattern at slow pace. Pt demonstrated upright posture, good step length. Cues to decrease bilateral UE weight bearing and increase left LE weight bearing.   -LM     Row Name 05/14/19 0821          General ROM    GENERAL ROM COMMENTS  L hip impaired 25%; otherwise, BLE WFL  -LM     Row Name 05/14/19 0821          MMT (Manual Muscle Testing)    General MMT Comments  LLE grossly 3+/5, RLE 4/5  -LM     Row Name 05/14/19 0821          Motor Assessment/Intervention    Additional Documentation  Balance (Group);Therapeutic Exercise (Group);Therapeutic Exercise Interventions (Group)  -LM     Row Name 05/14/19 0821          Therapeutic Exercise    18280 - PT Therapeutic Exercise Minutes  8  -LM     Row Name 05/14/19 0821          Therapeutic Exercise    Lower Extremity (Therapeutic Exercise)  gluteal sets;heel slides, left;LAQ (long arc quad), left;marching while seated;quad sets, left;SAQ (short arc quad), left;SLR (straight leg raise), left  -LM     Lower Extremity Range of Motion (Therapeutic Exercise)  hip abduction/adduction, left;ankle dorsiflexion/plantar flexion, bilateral  -LM     Exercise Type (Therapeutic Exercise)  AROM (active range of motion)  -LM     Position (Therapeutic Exercise)  seated  -LM     Sets/Reps (Therapeutic Exercise)  x15 reps each  -LM     Comment (Therapeutic Exercise)  Cues for technique; Ronald SLR  -LM     Row Name 05/14/19 0821          Balance    Balance  static  sitting balance;static standing balance  -LM     Row Name 05/14/19 0821          Static Sitting Balance    Level of [place] (Unsupported Sitting, Static Balance)  supervision  -LM     Sitting Position (Unsupported Sitting, Static Balance)  sitting in chair  -LM     Time Able to Maintain Position (Unsupported Sitting, Static Balance)  2 to 3 minutes  -LM     Row Name 05/14/19 0821          Static Standing Balance    Level of [place] (Supported Standing, Static Balance)  standby assist  -LM     Time Able to Maintain Position (Supported Standing, Static Balance)  1 to 2 minutes  -LM     Assistive Device Utilized (Supported Standing, Static Balance)  walker, rolling  -LM     Row Name 05/14/19 0821          Sensory Assessment/Intervention    Sensory General Assessment  no sensation deficits identified  -LM     Row Name 05/14/19 0821          Pain Assessment    Additional Documentation  Pain Scale: Numbers Pre/Post-Treatment (Group)  -LM     Row Name 05/14/19 0821          Pain Scale: Numbers Pre/Post-Treatment    Pain Scale: Numbers, Pretreatment  2/10  -LM     Pain Scale: Numbers, Post-Treatment  2/10  -LM     Pain Location - Side  Left  -LM     Pain Location - Orientation  lower  -LM     Pain Location  extremity  -LM     Pain Intervention(s)  Repositioned;Ambulation/increased activity  -LM     Row Name             Wound 05/13/19 1611 Left hip incision    Wound - Properties Group Date first assessed: 05/13/19  -LB Time first assessed: 1611  -LB Side: Left  -LB Location: hip  -LB Type: incision  -LB    Row Name 05/14/19 0821          Plan of Care Review    Plan of Care Reviewed With  patient;daughter  -LM     Row Name 05/14/19 0821          Physical Therapy Clinical Impression    Date of Referral to PT  05/13/19  -LM     PT Diagnosis (PT Clinical Impression)  s/p elective left total anterior hip arthroplasty  -LM     Prognosis (PT Clinical Impression)  good  -LM     Criteria for Skilled Interventions Met (PT  Clinical Impression)  no;other (see comments) pt anticipating d/c today  -LM     Rehab Potential (PT Clinical Summary)  good, to achieve stated therapy goals  -LM     Care Plan Review (PT)  evaluation/treatment results reviewed;care plan/treatment goals reviewed;risks/benefits reviewed;current/potential barriers reviewed;patient/other agree to care plan  -LM     Care Plan Review, Other Participant (PT Clinical Impression)  daughter  -LM     Referral Needed to Another Service (PT)  home health care  -LM     Row Name 05/14/19 0821          Vital Signs    O2 Delivery Pre Treatment  room air  -LM     O2 Delivery Intra Treatment  room air  -LM     O2 Delivery Post Treatment  room air  -LM     Pre Patient Position  Supine  -LM     Intra Patient Position  Standing  -LM     Post Patient Position  Sitting  -LM     Row Name 05/14/19 0821          Positioning and Restraints    Pre-Treatment Position  in bed  -LM     Post Treatment Position  chair  -LM     In Chair  notified nsg;reclined;sitting;call light within reach;encouraged to call for assist;exit alarm on;with family/caregiver;legs elevated  -LM     Row Name 05/14/19 0821          Physical Therapy Discharge Summary    Additional Documentation  Discharge Summary, PT Eval (Group)  -LM     Row Name 05/14/19 0821          Discharge Summary, PT Eval    Reason for Discharge (PT Discharge Summary)  patient discharged from this facility  -LM     Outcomes Achieved Upon Discharge (PT Discharge Summary)  discharge from facility occurred on same date as evaluation  -LM     Transfer to Another Level of Care or Facility (PT Discharge Summary)  patient to receive therapy via home health  -LM     Row Name 05/14/19 0821          Living Environment    Home Accessibility  wheelchair accessible ramp to enter  -LM       User Key  (r) = Recorded By, (t) = Taken By, (c) = Cosigned By    Initials Name Provider Type    Amauri Oglesby, RN Registered Nurse    Zamzam Kinsey, PT Physical  Therapist          Physical Therapy Education     Title: PT OT SLP Therapies (Done)     Topic: Physical Therapy (Done)     Point: Mobility training (Done)     Learning Progress Summary           Patient Acceptance, E,D,H, VU,DU by  at 5/14/2019  8:21 AM    Comment:  Reviewed anterior hip precautions, benefits of activity, safety with mobility and transfers, correct gait mechanics, HEP, car transfer.   Family Acceptance, E,D,H, VU,DU by LM at 5/14/2019  8:21 AM    Comment:  Reviewed anterior hip precautions, benefits of activity, safety with mobility and transfers, correct gait mechanics, HEP, car transfer.                   Point: Home exercise program (Done)     Learning Progress Summary           Patient Acceptance, E,D,H, VU,DU by  at 5/14/2019  8:21 AM    Comment:  Reviewed anterior hip precautions, benefits of activity, safety with mobility and transfers, correct gait mechanics, HEP, car transfer.   Family Acceptance, E,D,H, VU,DU by LM at 5/14/2019  8:21 AM    Comment:  Reviewed anterior hip precautions, benefits of activity, safety with mobility and transfers, correct gait mechanics, HEP, car transfer.                   Point: Body mechanics (Done)     Learning Progress Summary           Patient Acceptance, E,D,H, VU,DU by  at 5/14/2019  8:21 AM    Comment:  Reviewed anterior hip precautions, benefits of activity, safety with mobility and transfers, correct gait mechanics, HEP, car transfer.   Family Acceptance, E,D,H, VU,DU by  at 5/14/2019  8:21 AM    Comment:  Reviewed anterior hip precautions, benefits of activity, safety with mobility and transfers, correct gait mechanics, HEP, car transfer.                   Point: Precautions (Done)     Learning Progress Summary           Patient Acceptance, E,D,H, VU,DU by  at 5/14/2019  8:21 AM    Comment:  Reviewed anterior hip precautions, benefits of activity, safety with mobility and transfers, correct gait mechanics, HEP, car transfer.   Family  Acceptance, E,D,H, NIKOLAI,DU by  at 5/14/2019  8:21 AM    Comment:  Reviewed anterior hip precautions, benefits of activity, safety with mobility and transfers, correct gait mechanics, HEP, car transfer.                               User Key     Initials Effective Dates Name Provider Type Discipline     04/03/18 -  Zamzam Connell, PT Physical Therapist PT                PT Recommendation and Plan  Anticipated Discharge Disposition (PT): home with assist, home with home health  Therapy Frequency (PT Clinical Impression): 2 times/day  Outcome Summary/Treatment Plan (PT)  Anticipated Equipment Needs at Discharge (PT): other (see comments)(none)  Anticipated Discharge Disposition (PT): home with assist, home with home health  Referral Needed to Another Service (PT): home health care  Reason for Discharge (PT Discharge Summary): patient discharged from this facility  Plan of Care Reviewed With: patient, daughter  Outcome Summary: PT eval complete. Pt ambulated 500 feet with SBA and RW, demonstrated good balance and safety throughout. Reviewed anterior hip precautions and HEP. Pt required Ronald for bed mobility and CGA for transfers. Pt anticipating d/c home with assist (daughter) and HHPT today. PADD score: 10    Outcome Measures     Row Name 05/14/19 0821             How much help from another person do you currently need...    Turning from your back to your side while in flat bed without using bedrails?  3  -LM      Moving from lying on back to sitting on the side of a flat bed without bedrails?  3  -LM      Moving to and from a bed to a chair (including a wheelchair)?  3  -LM      Standing up from a chair using your arms (e.g., wheelchair, bedside chair)?  3  -LM      Climbing 3-5 steps with a railing?  3  -LM      To walk in hospital room?  4  -LM      AM-PAC 6 Clicks Score  19  -LM         Functional Assessment    Outcome Measure Options  AM-PAC 6 Clicks Basic Mobility (PT)  -LM        User Key  (r) = Recorded By,  (t) = Taken By, (c) = Cosigned By    Initials Name Provider Type    Zamzam Kinsey, PT Physical Therapist           Time Calculation:   PT Charges     Row Name 05/14/19 0821             Time Calculation    Start Time  0821  -LM      PT Received On  05/14/19  -LM      PT Goal Re-Cert Due Date  05/24/19  -LM         Time Calculation- PT    Total Timed Code Minutes- PT  12 minute(s)  -LM         Timed Charges    34022 - PT Therapeutic Exercise Minutes  8  -LM      32316 - Gait Training Minutes   4  -LM        User Key  (r) = Recorded By, (t) = Taken By, (c) = Cosigned By    Initials Name Provider Type    Zamzam Kinsey, PT Physical Therapist        Therapy Charges for Today     Code Description Service Date Service Provider Modifiers Qty    22806346711 HC PT THER PROC EA 15 MIN 5/14/2019 Zamzam Connell, PT GP 1    83609688907 HC PT EVAL MOD COMPLEXITY 3 5/14/2019 Zamzam Connell, PT GP 1          PT G-Codes  Outcome Measure Options: AM-PAC 6 Clicks Basic Mobility (PT)  AM-PAC 6 Clicks Score: 19    PT Discharge Summary  Anticipated Discharge Disposition (PT): home with assist, home with home health  Reason for Discharge: Discharge from facility  Outcomes Achieved: Discharge from facility occurred on same date as evluation  Discharge Destination: Home with assist, Home with home health    Zamzam Connell, [place]  5/14/2019         Electronically signed by Zamzam Connell PT at 5/14/2019  9:16 AM

## 2019-05-14 NOTE — PROGRESS NOTES
Mary Headely  [id number]  1950    /60 (BP Location: Right arm, Patient Position: Lying)   Pulse 88   Temp 97.8 °F (36.6 °C) (Oral)   Resp 16   SpO2 93%     Lab Results (last 24 hours)     Procedure Component Value Units Date/Time    CBC & Differential [[id number]] Collected:  05/14/19 0535    Specimen:  Blood Updated:  05/14/19 0706    Narrative:       The following orders were created for panel order CBC & Differential.  Procedure                               Abnormality         Status                     ---------                               -----------         ------                     CBC Auto Differential[[id number]]        Abnormal            Final result                 Please view results for these tests on the individual orders.    CBC Auto Differential [[id number]]  (Abnormal) Collected:  05/14/19 0535    Specimen:  Blood Updated:  05/14/19 0706     WBC 14.39 10*3/mm3      RBC 3.85 10*6/mm3      Hemoglobin 11.7 g/dL      Hematocrit 36.2 %      MCV 94.0 fL      MCH 30.4 pg      MCHC 32.3 g/dL      RDW 13.2 %      RDW-SD 45.7 fl      MPV 10.1 fL      Platelets 310 10*3/mm3      Neutrophil % 90.3 %      Lymphocyte % 4.9 %      Monocyte % 4.7 %      Eosinophil % 0.0 %      Basophil % 0.1 %      Immature Grans % 0.3 %      Neutrophils, Absolute 13.00 10*3/mm3      Lymphocytes, Absolute 0.70 10*3/mm3      Monocytes, Absolute 0.68 10*3/mm3      Eosinophils, Absolute 0.00 10*3/mm3      Basophils, Absolute 0.01 10*3/mm3      Immature Grans, Absolute 0.05 10*3/mm3     Basic Metabolic Panel [[id number]]  (Abnormal) Collected:  05/14/19 0535    Specimen:  Blood Updated:  05/14/19 0700     Glucose 176 mg/dL      BUN 16 mg/dL      Creatinine 0.81 mg/dL      Sodium 135 mmol/L      Potassium 4.2 mmol/L      Chloride 102 mmol/L      CO2 25.0 mmol/L      Calcium 8.2 mg/dL      eGFR Non African Amer 70 mL/min/1.73      BUN/Creatinine Ratio 19.8     Anion Gap 8.0 mmol/L     Narrative:       GFR Normal  >60  Chronic Kidney Disease <60  Kidney Failure <15    POC Glucose Once [[id number]]  (Abnormal) Collected:  05/13/19 2015    Specimen:  Blood Updated:  05/13/19 2016     Glucose 166 mg/dL     Potassium [[id number]]  (Normal) Collected:  05/13/19 1212    Specimen:  Blood Updated:  05/13/19 1249     Potassium 3.8 mmol/L           Patient Care Team:  Gloria Jarquin PA as PCP - General    SUBJECTIVE  Pain well controlled.  Good start in physical therapy.    PHYSICAL EXAM  Incision benign  Minimal thigh swelling  Neurovascularly intact to knees and toes       Status post total replacement of left hip    Arthritis of left hip    HTN (hypertension)    Hypothyroid      PLAN / DISPOSITION:  Hemoglobin 11.7 today - no transfusion anticipated  Discharge home today if cleared by PT    Andriy Torres MD  05/14/19  8:09 AM

## 2019-05-14 NOTE — NURSING NOTE
Pt. Discharged home with assist of family and home health.   BSC delivered to pt. Room and  pharmacy delivered meds to bedside.   Discharge packet/instructions given to pt.   IV removed.   Discharge education complete.  Will continue to monitor and will notify MD of any changes PRN.   Jailyn Lugo RN

## 2019-05-14 NOTE — PLAN OF CARE
Problem: Patient Care Overview  Goal: Plan of Care Review  Outcome: Ongoing (interventions implemented as appropriate)   05/14/19 0519   Coping/Psychosocial   Plan of Care Reviewed With patient;family   Plan of Care Review   Progress improving   OTHER   Outcome Summary Pt VSS and AOx4. Pt ambulated 300 in hallway with one assist. She is voiding well. Have managed nausea with zofran. Pt is calm and cooperative. Pt on 2L O2 NC.       Problem: Pain, Acute (Adult)  Goal: Identify Related Risk Factors and Signs and Symptoms  Outcome: Ongoing (interventions implemented as appropriate)    Goal: Acceptable Pain Control/Comfort Level  Outcome: Ongoing (interventions implemented as appropriate)      Problem: Fall Risk (Adult)  Goal: Identify Related Risk Factors and Signs and Symptoms  Outcome: Ongoing (interventions implemented as appropriate)    Goal: Absence of Fall  Outcome: Ongoing (interventions implemented as appropriate)

## 2019-05-17 ENCOUNTER — NURSE TRIAGE (OUTPATIENT)
Dept: CALL CENTER | Facility: HOSPITAL | Age: 69
End: 2019-05-17

## 2019-05-17 LAB
ABO + RH BLD: NORMAL
ABO + RH BLD: NORMAL
BH BB BLOOD EXPIRATION DATE: NORMAL
BH BB BLOOD EXPIRATION DATE: NORMAL
BH BB BLOOD TYPE BARCODE: 7300
BH BB BLOOD TYPE BARCODE: 7300
BH BB DISPENSE STATUS: NORMAL
BH BB DISPENSE STATUS: NORMAL
BH BB PRODUCT CODE: NORMAL
BH BB PRODUCT CODE: NORMAL
BH BB UNIT NUMBER: NORMAL
BH BB UNIT NUMBER: NORMAL
UNIT  ABO: NORMAL
UNIT  ABO: NORMAL
UNIT  RH: NORMAL
UNIT  RH: NORMAL

## 2019-05-17 NOTE — TELEPHONE ENCOUNTER
Suggested increasing her fiber and fluid intake and try miralax.     Reason for Disposition  • Other post-op symptom or question  • Taking new prescription medication    Additional Information  • Negative: Sounds like a life-threatening emergency to the triager  • Negative: Chest pain  • Negative: Difficulty breathing  • Negative: Surgical incision symptoms and questions  • Negative: [1] Discomfort (pain, burning or stinging) when passing urine AND [2] male  • Negative: [1] Discomfort (pain, burning or stinging) when passing urine AND [2] female  • Negative: New or worsening leg (calf, thigh) pain  • Negative: New or worsening leg swelling  • Negative: Dizziness is severe, or persists > 24 hours after surgery  • Negative: Pain, redness, swelling, or pus at IV Site  • Negative: Symptoms arising from use of a urinary catheter (Tejeda or Coude)  • Negative: Cast problems or questions  • Negative: Medication question  • Negative: [1] Widespread rash AND [2] bright red, sunburn-like  • Negative: [1] SEVERE headache AND [2] after spinal (epidural) anesthesia  • Negative: [1] Vomiting AND [2] persists > 4 hours  • Negative: [1] Vomiting AND [2] abdomen looks much more swollen than usual  • Negative: [1] Drinking very little AND [2] dehydration suspected (e.g., no urine > 12 hours, very dry mouth, very lightheaded)  • Negative: Patient sounds very sick or weak to the triager  • Negative: Sounds like a serious complication to the triager  • Negative: Fever > 100.5 F (38.1 C)  • Negative: [1] SEVERE post-op pain (e.g., excruciating, pain scale 8-10) AND [2] not controlled with pain medications  • Negative: [1] Caller has URGENT question AND [2] triager unable to answer question  • Negative: [1] Headache AND [2] after spinal (epidural) anesthesia AND [3] not severe  • Negative: Fever present > 3 days (72 hours)  • Negative: [1] MILD-MODERATE post-op pain (e.g., pain scale 1-7) AND [2] not controlled with pain medications  •  "Negative: [1] Caller has NON-URGENT question AND [2] triager unable to answer question  • Negative: General activity, questions about  • Negative: Resuming driving, questions about  • Negative: Resuming sexual relations, questions about  • Negative: Getting the incision wet, questions about  • Negative: [1] Vomiting AND [2] present < 4 hours  • Constipation  • Negative: [1] Abdomen pain is main symptom AND [2] adult male  • Negative: [1] Abdomen pain is main symptom AND [2] adult female  • Negative: Rectal bleeding or blood in stool is main symptom  • Negative: Rectal pain or itching is main symptom  • Negative: Constipation in a cancer patient who is currently (or recently) receiving chemotherapy or radiation therapy, or cancer patient who has metastatic or end-stage cancer and is receiving palliative care  • Negative: Patient sounds very sick or weak to the triager  • Negative: [1] Vomiting AND [2] abdomen looks much more swollen than usual  • Negative: [1] Vomiting AND [2] contains bile (green color)  • Negative: [1] Constant abdominal pain AND [2] present > 2 hours  • Negative: [1] Rectal pain or fullness from fecal impaction (rectum full of stool) AND [2] NOT better after SITZ bath, suppository or enema  • Negative: [1] Intermittent mild abdominal pain AND [2] fever  • Negative: Abdomen is more swollen than usual  • Negative: Last bowel movement (BM) > 4 days ago  • Negative: Leaking stool  • Negative: Unable to have a bowel movement (BM) without manually removing stool (using finger to pull out stool or perform disimpaction)  • Negative: Unable to have a bowel movement (BM) without laxative or enema  • Negative: [1] Constipation persists > 1 week AND [2] following Constipation Care Advice  • Negative: [1] Weight loss > 10 pounds (5 kg) AND [2] not dieting  • Negative: Pencil-like, narrow stools    Answer Assessment - Initial Assessment Questions  1. SYMPTOM: \"What's the main symptom you're concerned about?\" " "(e.g., pain, fever, vomiting)      Constipation  2. ONSET: \"When did ________  start?\"      5/13/19  3. SURGERY: \"What surgery was performed?\"      L total hip replacement  4. DATE of SURGERY: \"When was surgery performed?\"       5/13/19  5. ANESTHESIA: \" What type of anesthesia did you have?\" (e.g., general, spinal, epidural, local)      General  6. PAIN: \"Is there any pain?\" If so, ask: \"How bad is it?\"  (Scale 1-10; or mild, moderate, severe)      3  7. FEVER: \"Do you have a fever?\" If so, ask: \"What is your temperature, how was it measured, and when did it start?\"      Afebrile  8. VOMITING: \"Is there any vomiting?\" If yes, ask: \"How many times?\"      NA  9. BLEEDING: \"Is there any bleeding?\" If so, ask: \"How much?\" and \"Where?\"      NA  10. OTHER SYMPTOMS: \"Do you have any other symptoms?\" (e.g., drainage from wound, painful urination, constipation)        Constipation, also taking narcotic pain med    Answer Assessment - Initial Assessment Questions  1. STOOL PATTERN OR FREQUENCY: \"How often do you pass bowel movements (BMs)?\"  (Normal range: tid to q 3 days)  \"When was the last BM passed?\"        5/13/19  2. STRAINING: \"Do you have to strain to have a BM?\"       Yes  3. RECTAL PAIN: \"Does your rectum hurt when the stool comes out?\" If so, ask: \"Do you have hemorrhoids? How bad is the pain?\"  (Scale 1-10; or mild, moderate, severe)      Yes  4. STOOL COMPOSITION: \"Are the stools hard?\"       Hard  5. BLOOD ON STOOLS: \"Has there been any blood on the toilet tissue or on the surface of the BM?\" If so, ask: \"When was the last time?\"       none  6. CHRONIC CONSTIPATION: \"Is this a new problem for you?\"  If no, ask: How long have you had this problem?\" (days, weeks, months)       Total knee replacement 5/13/19 and taking pain medication  7. CHANGES IN DIET: \"Have there been any recent changes in your diet?\"      No  8. MEDICATIONS: \"Have you been taking any new medications?\"      Yes pain medication, percocet  9. " "LAXATIVES: \"Have you been using any laxatives or enemas?\"  If yes, ask \"What, how often, and when was the last time?\"      Colace stool softener  10. CAUSE: \"What do you think is causing the constipation?\"         Surgery and pain med  11. OTHER SYMPTOMS: \"Do you have any other symptoms?\" (e.g., abdominal pain, fever, vomiting)        None  12. PREGNANCY: \"Is there any chance you are pregnant?\" \"When was your last menstrual period?\"        NA    Protocols used: POST-OP SYMPTOMS AND QUESTIONS-ADULT-, CONSTIPATION-ADULT-AH      "

## 2019-12-02 ENCOUNTER — HOSPITAL ENCOUNTER (OUTPATIENT)
Dept: MAMMOGRAPHY | Facility: HOSPITAL | Age: 69
Discharge: HOME | End: 2019-12-02
Payer: MEDICARE

## 2019-12-02 DIAGNOSIS — Z12.31 BREAST CANCER SCREENING BY MAMMOGRAM: ICD-10-CM

## 2019-12-02 PROCEDURE — 77067 SCR MAMMO BI INCL CAD: CPT

## 2019-12-09 ENCOUNTER — HOSPITAL ENCOUNTER (OUTPATIENT)
Dept: ULTRASOUND IMAGING | Facility: HOSPITAL | Age: 69
Discharge: HOME | End: 2019-12-09
Payer: MEDICARE

## 2019-12-09 ENCOUNTER — HOSPITAL ENCOUNTER (OUTPATIENT)
Dept: MAMMOGRAPHY | Facility: HOSPITAL | Age: 69
Discharge: HOME | End: 2019-12-09
Payer: MEDICARE

## 2019-12-09 DIAGNOSIS — N63.20 LEFT BREAST MASS: ICD-10-CM

## 2019-12-09 DIAGNOSIS — R92.8 ABNORMAL MAMMOGRAM: ICD-10-CM

## 2019-12-09 PROCEDURE — 76642 ULTRASOUND BREAST LIMITED: CPT

## 2019-12-09 PROCEDURE — 77065 DX MAMMO INCL CAD UNI: CPT

## 2024-09-12 ENCOUNTER — HOSPITAL ENCOUNTER (OUTPATIENT)
Dept: MAMMOGRAPHY | Facility: HOSPITAL | Age: 74
Discharge: HOME | End: 2024-09-12
Payer: MEDICARE

## 2024-09-12 VITALS — WEIGHT: 200 LBS | HEIGHT: 60 IN | BODY MASS INDEX: 39.27 KG/M2

## 2024-09-12 DIAGNOSIS — Z12.39 ENCOUNTER FOR OTHER SCREENING FOR MALIGNANT NEOPLASM OF BREAST: ICD-10-CM

## 2024-09-12 DIAGNOSIS — Z12.31 ENCOUNTER FOR SCREENING MAMMOGRAM FOR MALIGNANT NEOPLASM OF BREAST: ICD-10-CM

## 2024-09-12 PROCEDURE — 77063 BREAST TOMOSYNTHESIS BI: CPT

## (undated) DEVICE — ANTIBACTERIAL UNDYED BRAIDED (POLYGLACTIN 910), SYNTHETIC ABSORBABLE SUTURE: Brand: COATED VICRYL

## (undated) DEVICE — STERILE PVP: Brand: MEDLINE INDUSTRIES, INC.

## (undated) DEVICE — SYS SKIN CLS DERMABOND PRINEO W/22CM MESH TP

## (undated) DEVICE — INTENDED USE FOR SURGICAL MARKING ON INTACT SKIN, ALSO PROVIDES A PERMANENT METHOD OF IDENTIFYING OBJECTS IN THE OPERATING ROOM: Brand: WRITESITE® REGULAR TIP SKIN MARKER

## (undated) DEVICE — GLV SURG SIGNATURE TOUCH PF LTX 8 STRL BX/50

## (undated) DEVICE — SYR LUERLOK 50ML

## (undated) DEVICE — ENCORE® LATEX MICRO SIZE 8, STERILE LATEX POWDER-FREE SURGICAL GLOVE: Brand: ENCORE

## (undated) DEVICE — 1 ML TUBERCULIN SYRINGE REGULAR TIP: Brand: MONOJECT

## (undated) DEVICE — COVER,LIGHT HANDLE,FLX,1/PK: Brand: MEDLINE INDUSTRIES, INC.

## (undated) DEVICE — STRYKER PERFORMANCE SERIES SAGITTAL BLADE: Brand: STRYKER PERFORMANCE SERIES

## (undated) DEVICE — PK MAJ TOTL HIP ANT 10

## (undated) DEVICE — BNDG ELAS CO-FLEX SLF ADHR 4IN5YD LF STRL

## (undated) DEVICE — NDL SPINE 18G 31/2IN PNK